# Patient Record
Sex: FEMALE | NOT HISPANIC OR LATINO | Employment: OTHER | URBAN - METROPOLITAN AREA
[De-identification: names, ages, dates, MRNs, and addresses within clinical notes are randomized per-mention and may not be internally consistent; named-entity substitution may affect disease eponyms.]

---

## 2018-10-02 ENCOUNTER — OFFICE VISIT - HEALTHEAST (OUTPATIENT)
Dept: FAMILY MEDICINE | Facility: CLINIC | Age: 23
End: 2018-10-02

## 2018-10-02 ENCOUNTER — COMMUNICATION - HEALTHEAST (OUTPATIENT)
Dept: TELEHEALTH | Facility: CLINIC | Age: 23
End: 2018-10-02

## 2018-10-02 ENCOUNTER — HOSPITAL ENCOUNTER (OUTPATIENT)
Dept: LAB | Age: 23
Setting detail: SPECIMEN
Discharge: HOME | End: 2018-10-02

## 2018-10-02 DIAGNOSIS — N83.209 OVARIAN CYST: ICD-10-CM

## 2018-10-02 DIAGNOSIS — Z76.89 ESTABLISHING CARE WITH NEW DOCTOR, ENCOUNTER FOR: ICD-10-CM

## 2018-10-02 DIAGNOSIS — Z11.3 VENEREAL DISEASE SCREENING: ICD-10-CM

## 2018-10-02 DIAGNOSIS — Z00.00 ANNUAL PHYSICAL EXAM: ICD-10-CM

## 2018-10-02 LAB — HIV 1+2 AB+HIV1 P24 AG SERPL QL IA: NEGATIVE

## 2018-10-02 ASSESSMENT — MIFFLIN-ST. JEOR: SCORE: 1647.4

## 2018-10-03 ENCOUNTER — AMBULATORY - HEALTHEAST (OUTPATIENT)
Dept: FAMILY MEDICINE | Facility: CLINIC | Age: 23
End: 2018-10-03

## 2018-10-03 LAB
C TRACH DNA SPEC QL PROBE+SIG AMP: NEGATIVE
N GONORRHOEA DNA SPEC QL NAA+PROBE: NEGATIVE
T PALLIDUM AB SER QL: NEGATIVE

## 2018-10-04 ENCOUNTER — COMMUNICATION - HEALTHEAST (OUTPATIENT)
Dept: FAMILY MEDICINE | Facility: CLINIC | Age: 23
End: 2018-10-04

## 2018-10-15 ENCOUNTER — HOSPITAL ENCOUNTER (OUTPATIENT)
Dept: ULTRASOUND IMAGING | Facility: HOSPITAL | Age: 23
Discharge: HOME | End: 2018-10-15
Attending: FAMILY MEDICINE

## 2018-10-15 DIAGNOSIS — N83.209 OVARIAN CYST: ICD-10-CM

## 2018-10-19 ENCOUNTER — OFFICE VISIT - HEALTHEAST (OUTPATIENT)
Dept: FAMILY MEDICINE | Facility: CLINIC | Age: 23
End: 2018-10-19

## 2018-10-19 ENCOUNTER — HOSPITAL ENCOUNTER (OUTPATIENT)
Dept: LAB | Age: 23
Setting detail: SPECIMEN
Discharge: HOME | End: 2018-10-19

## 2018-10-19 DIAGNOSIS — Z34.90 EARLY STAGE OF PREGNANCY: ICD-10-CM

## 2018-10-19 LAB — HCG SERPL-ACNC: 6151 MLU/ML (ref 0–4)

## 2018-10-24 ENCOUNTER — COMMUNICATION - HEALTHEAST (OUTPATIENT)
Dept: FAMILY MEDICINE | Facility: CLINIC | Age: 23
End: 2018-10-24

## 2018-10-29 ENCOUNTER — HOSPITAL ENCOUNTER (OUTPATIENT)
Dept: ULTRASOUND IMAGING | Facility: HOSPITAL | Age: 23
Discharge: HOME | End: 2018-10-29
Attending: FAMILY MEDICINE

## 2018-10-31 ENCOUNTER — COMMUNICATION - HEALTHEAST (OUTPATIENT)
Dept: FAMILY MEDICINE | Facility: CLINIC | Age: 23
End: 2018-10-31

## 2018-11-09 ENCOUNTER — OFFICE VISIT - HEALTHEAST (OUTPATIENT)
Dept: FAMILY MEDICINE | Facility: CLINIC | Age: 23
End: 2018-11-09

## 2018-11-09 DIAGNOSIS — Z34.90 PREGNANCY: ICD-10-CM

## 2018-11-09 ASSESSMENT — MIFFLIN-ST. JEOR: SCORE: 1684.25

## 2019-05-10 ENCOUNTER — OFFICE VISIT - HEALTHEAST (OUTPATIENT)
Dept: FAMILY MEDICINE | Facility: CLINIC | Age: 24
End: 2019-05-10

## 2019-05-10 DIAGNOSIS — Z30.9 ENCOUNTER FOR CONTRACEPTIVE MANAGEMENT, UNSPECIFIED TYPE: ICD-10-CM

## 2019-05-10 DIAGNOSIS — M54.50 ACUTE BILATERAL LOW BACK PAIN WITHOUT SCIATICA: ICD-10-CM

## 2019-05-10 ASSESSMENT — MIFFLIN-ST. JEOR: SCORE: 1702.97

## 2020-07-21 ENCOUNTER — OFFICE VISIT - HEALTHEAST (OUTPATIENT)
Dept: FAMILY MEDICINE | Facility: CLINIC | Age: 25
End: 2020-07-21

## 2020-07-21 DIAGNOSIS — Z00.00 HEALTHCARE MAINTENANCE: ICD-10-CM

## 2020-07-21 DIAGNOSIS — R03.0 ELEVATED BLOOD PRESSURE READING WITHOUT DIAGNOSIS OF HYPERTENSION: ICD-10-CM

## 2020-07-21 DIAGNOSIS — Z32.00 POSSIBLE PREGNANCY: ICD-10-CM

## 2020-07-21 LAB
ALBUMIN UR-MCNC: NEGATIVE MG/DL
ANION GAP SERPL CALCULATED.3IONS-SCNC: 11 MMOL/L (ref 5–18)
APPEARANCE UR: CLEAR
BACTERIA #/AREA URNS HPF: ABNORMAL HPF
BILIRUB UR QL STRIP: NEGATIVE
BUN SERPL-MCNC: 7 MG/DL (ref 8–22)
CALCIUM SERPL-MCNC: 9.3 MG/DL (ref 8.5–10.5)
CHLORIDE BLD-SCNC: 104 MMOL/L (ref 98–107)
CHOLEST SERPL-MCNC: 145 MG/DL
CO2 SERPL-SCNC: 25 MMOL/L (ref 22–31)
COLOR UR AUTO: YELLOW
CREAT SERPL-MCNC: 0.75 MG/DL (ref 0.6–1.1)
FASTING STATUS PATIENT QL REPORTED: NO
GFR SERPL CREATININE-BSD FRML MDRD: >60 ML/MIN/1.73M2
GLUCOSE BLD-MCNC: 88 MG/DL (ref 70–125)
GLUCOSE UR STRIP-MCNC: NEGATIVE MG/DL
HCG UR QL: NEGATIVE
HDLC SERPL-MCNC: 34 MG/DL
HGB BLD-MCNC: 13.8 G/DL (ref 12–16)
HGB UR QL STRIP: ABNORMAL
KETONES UR STRIP-MCNC: NEGATIVE MG/DL
LEUKOCYTE ESTERASE UR QL STRIP: ABNORMAL
NITRATE UR QL: NEGATIVE
PH UR STRIP: 6 [PH] (ref 5–8)
POTASSIUM BLD-SCNC: 4 MMOL/L (ref 3.5–5)
RBC #/AREA URNS AUTO: ABNORMAL HPF
SODIUM SERPL-SCNC: 140 MMOL/L (ref 136–145)
SP GR UR STRIP: 1.02 (ref 1–1.03)
SQUAMOUS #/AREA URNS AUTO: ABNORMAL LPF
UROBILINOGEN UR STRIP-ACNC: ABNORMAL
WBC #/AREA URNS AUTO: ABNORMAL HPF

## 2020-07-21 ASSESSMENT — MIFFLIN-ST. JEOR: SCORE: 1687.09

## 2020-07-22 LAB — BACTERIA SPEC CULT: NORMAL

## 2020-07-24 ENCOUNTER — COMMUNICATION - HEALTHEAST (OUTPATIENT)
Dept: FAMILY MEDICINE | Facility: CLINIC | Age: 25
End: 2020-07-24

## 2020-12-21 ENCOUNTER — OFFICE VISIT - HEALTHEAST (OUTPATIENT)
Dept: FAMILY MEDICINE | Facility: CLINIC | Age: 25
End: 2020-12-21

## 2020-12-21 DIAGNOSIS — Z11.59 ENCOUNTER FOR HEPATITIS C SCREENING TEST FOR LOW RISK PATIENT: ICD-10-CM

## 2020-12-21 DIAGNOSIS — N76.0 BACTERIAL VAGINOSIS: ICD-10-CM

## 2020-12-21 DIAGNOSIS — M54.50 CHRONIC BILATERAL LOW BACK PAIN WITHOUT SCIATICA: ICD-10-CM

## 2020-12-21 DIAGNOSIS — N63.0 BREAST MASS: ICD-10-CM

## 2020-12-21 DIAGNOSIS — Z00.00 ANNUAL PHYSICAL EXAM: ICD-10-CM

## 2020-12-21 DIAGNOSIS — Z72.51 UNPROTECTED SEX: ICD-10-CM

## 2020-12-21 DIAGNOSIS — G89.29 CHRONIC BILATERAL LOW BACK PAIN WITHOUT SCIATICA: ICD-10-CM

## 2020-12-21 DIAGNOSIS — R03.0 ELEVATED BLOOD PRESSURE READING WITHOUT DIAGNOSIS OF HYPERTENSION: ICD-10-CM

## 2020-12-21 DIAGNOSIS — B96.89 BACTERIAL VAGINOSIS: ICD-10-CM

## 2020-12-21 LAB
CLUE CELLS: ABNORMAL
HCG UR QL: NEGATIVE
HIV 1+2 AB+HIV1 P24 AG SERPL QL IA: NEGATIVE
TRICHOMONAS, WET PREP: ABNORMAL
YEAST, WET PREP: ABNORMAL

## 2020-12-21 ASSESSMENT — MIFFLIN-ST. JEOR: SCORE: 1698.43

## 2020-12-22 LAB
HCV AB SERPL QL IA: NEGATIVE
T PALLIDUM AB SER QL: NEGATIVE

## 2020-12-23 ENCOUNTER — COMMUNICATION - HEALTHEAST (OUTPATIENT)
Dept: FAMILY MEDICINE | Facility: CLINIC | Age: 25
End: 2020-12-23

## 2020-12-23 LAB
BKR LAB AP ABNORMAL BLEEDING: NO
BKR LAB AP BIRTH CONTROL/HORMONES: NORMAL
BKR LAB AP CERVICAL APPEARANCE: NORMAL
BKR LAB AP GYN ADEQUACY: NORMAL
BKR LAB AP GYN INTERPRETATION: NORMAL
BKR LAB AP GYN OTHER FINDINGS: NORMAL
BKR LAB AP HPV REFLEX: NORMAL
BKR LAB AP LMP: NORMAL
BKR LAB AP PATIENT STATUS: NORMAL
BKR LAB AP PREVIOUS ABNORMAL: NORMAL
BKR LAB AP PREVIOUS NORMAL: NORMAL
HIGH RISK?: NO
PATH REPORT.COMMENTS IMP SPEC: NORMAL
RESULT FLAG (HE HISTORICAL CONVERSION): NORMAL

## 2020-12-24 LAB
C TRACH DNA SPEC QL PROBE+SIG AMP: NEGATIVE
N GONORRHOEA DNA SPEC QL NAA+PROBE: NEGATIVE

## 2020-12-29 ENCOUNTER — COMMUNICATION - HEALTHEAST (OUTPATIENT)
Dept: FAMILY MEDICINE | Facility: CLINIC | Age: 25
End: 2020-12-29

## 2021-01-20 ENCOUNTER — TRANSFERRED RECORDS (OUTPATIENT)
Dept: HEALTH INFORMATION MANAGEMENT | Facility: CLINIC | Age: 26
End: 2021-01-20

## 2021-01-21 ENCOUNTER — COMMUNICATION - HEALTHEAST (OUTPATIENT)
Dept: FAMILY MEDICINE | Facility: CLINIC | Age: 26
End: 2021-01-21

## 2021-01-25 ENCOUNTER — OFFICE VISIT - HEALTHEAST (OUTPATIENT)
Dept: FAMILY MEDICINE | Facility: CLINIC | Age: 26
End: 2021-01-25

## 2021-01-25 DIAGNOSIS — S69.91XS FINGER INJURY, RIGHT, SEQUELA: ICD-10-CM

## 2021-01-25 DIAGNOSIS — Z01.818 PREOP EXAMINATION: ICD-10-CM

## 2021-01-25 DIAGNOSIS — N63.10 LUMP OF RIGHT BREAST: ICD-10-CM

## 2021-01-25 LAB
HCG UR QL: NEGATIVE
HGB BLD-MCNC: 14.2 G/DL (ref 12–16)

## 2021-01-25 ASSESSMENT — MIFFLIN-ST. JEOR: SCORE: 1632.66

## 2021-01-27 ENCOUNTER — TRANSFERRED RECORDS (OUTPATIENT)
Dept: HEALTH INFORMATION MANAGEMENT | Facility: CLINIC | Age: 26
End: 2021-01-27

## 2021-02-02 ENCOUNTER — HOSPITAL ENCOUNTER (OUTPATIENT)
Dept: MAMMOGRAPHY | Facility: CLINIC | Age: 26
Discharge: HOME | End: 2021-02-02
Attending: FAMILY MEDICINE

## 2021-02-02 DIAGNOSIS — N63.10 LUMP OF RIGHT BREAST: ICD-10-CM

## 2021-02-03 ENCOUNTER — COMMUNICATION - HEALTHEAST (OUTPATIENT)
Dept: FAMILY MEDICINE | Facility: CLINIC | Age: 26
End: 2021-02-03

## 2021-02-15 ENCOUNTER — TRANSFERRED RECORDS (OUTPATIENT)
Dept: HEALTH INFORMATION MANAGEMENT | Facility: CLINIC | Age: 26
End: 2021-02-15

## 2021-03-03 ENCOUNTER — TRANSFERRED RECORDS (OUTPATIENT)
Dept: HEALTH INFORMATION MANAGEMENT | Facility: CLINIC | Age: 26
End: 2021-03-03

## 2021-03-10 ENCOUNTER — TRANSFERRED RECORDS (OUTPATIENT)
Dept: HEALTH INFORMATION MANAGEMENT | Facility: CLINIC | Age: 26
End: 2021-03-10

## 2021-04-12 ENCOUNTER — OFFICE VISIT - HEALTHEAST (OUTPATIENT)
Dept: FAMILY MEDICINE | Facility: CLINIC | Age: 26
End: 2021-04-12

## 2021-04-12 DIAGNOSIS — E66.09 CLASS 2 OBESITY DUE TO EXCESS CALORIES WITHOUT SERIOUS COMORBIDITY WITH BODY MASS INDEX (BMI) OF 35.0 TO 35.9 IN ADULT: ICD-10-CM

## 2021-04-12 DIAGNOSIS — M25.50 ARTHRALGIA, UNSPECIFIED JOINT: ICD-10-CM

## 2021-04-12 DIAGNOSIS — E66.812 CLASS 2 OBESITY DUE TO EXCESS CALORIES WITHOUT SERIOUS COMORBIDITY WITH BODY MASS INDEX (BMI) OF 35.0 TO 35.9 IN ADULT: ICD-10-CM

## 2021-04-12 DIAGNOSIS — R53.82 CHRONIC FATIGUE: ICD-10-CM

## 2021-04-12 DIAGNOSIS — Z82.69 FAMILY HISTORY OF SYSTEMIC LUPUS ERYTHEMATOSUS (SLE) IN MOTHER: ICD-10-CM

## 2021-04-12 DIAGNOSIS — R21 FACIAL RASH: ICD-10-CM

## 2021-04-13 ENCOUNTER — COMMUNICATION - HEALTHEAST (OUTPATIENT)
Dept: FAMILY MEDICINE | Facility: CLINIC | Age: 26
End: 2021-04-13

## 2021-04-19 ENCOUNTER — COMMUNICATION - HEALTHEAST (OUTPATIENT)
Dept: FAMILY MEDICINE | Facility: CLINIC | Age: 26
End: 2021-04-19

## 2021-04-21 ENCOUNTER — COMMUNICATION - HEALTHEAST (OUTPATIENT)
Dept: FAMILY MEDICINE | Facility: CLINIC | Age: 26
End: 2021-04-21

## 2021-04-23 ENCOUNTER — OFFICE VISIT - HEALTHEAST (OUTPATIENT)
Dept: FAMILY MEDICINE | Facility: CLINIC | Age: 26
End: 2021-04-23

## 2021-04-23 DIAGNOSIS — Z13.29 SCREENING FOR THYROID DISORDER: ICD-10-CM

## 2021-04-23 DIAGNOSIS — Z13.0 SCREENING FOR ENDOCRINE, NUTRITIONAL, METABOLIC AND IMMUNITY DISORDER: ICD-10-CM

## 2021-04-23 DIAGNOSIS — Z13.29 SCREENING FOR ENDOCRINE, NUTRITIONAL, METABOLIC AND IMMUNITY DISORDER: ICD-10-CM

## 2021-04-23 DIAGNOSIS — E78.5 DYSLIPIDEMIA: ICD-10-CM

## 2021-04-23 DIAGNOSIS — Z13.220 SCREENING FOR LIPID DISORDERS: ICD-10-CM

## 2021-04-23 DIAGNOSIS — E66.09 CLASS 1 OBESITY DUE TO EXCESS CALORIES WITH SERIOUS COMORBIDITY AND BODY MASS INDEX (BMI) OF 31.0 TO 31.9 IN ADULT: ICD-10-CM

## 2021-04-23 DIAGNOSIS — Z13.228 SCREENING FOR ENDOCRINE, NUTRITIONAL, METABOLIC AND IMMUNITY DISORDER: ICD-10-CM

## 2021-04-23 DIAGNOSIS — E66.811 CLASS 1 OBESITY DUE TO EXCESS CALORIES WITH SERIOUS COMORBIDITY AND BODY MASS INDEX (BMI) OF 31.0 TO 31.9 IN ADULT: ICD-10-CM

## 2021-04-23 DIAGNOSIS — Z13.0 SCREENING FOR DEFICIENCY ANEMIA: ICD-10-CM

## 2021-04-23 DIAGNOSIS — Z13.1 SCREENING FOR DIABETES MELLITUS: ICD-10-CM

## 2021-04-23 DIAGNOSIS — Z13.21 SCREENING FOR ENDOCRINE, NUTRITIONAL, METABOLIC AND IMMUNITY DISORDER: ICD-10-CM

## 2021-04-23 DIAGNOSIS — R06.83 SNORES: ICD-10-CM

## 2021-04-23 ASSESSMENT — MIFFLIN-ST. JEOR: SCORE: 1586.17

## 2021-04-23 NOTE — ASSESSMENT & PLAN NOTE
25 y.o. female in clinic today to discuss treatment of the following conditions through radical diet change, lifestyle modification and weight loss:  1. Class 1 obesity due to excess calories with serious comorbidity and body mass index (BMI) of 31.0 to 31.9 in adult    2. Snores    3. Dyslipidemia    4. Screening for diabetes mellitus    5. Screening for thyroid disorder    6. Screening for deficiency anemia    7. Screening for endocrine, nutritional, metabolic and immunity disorder    8. Screening for lipid disorders      This patient has been doing a great job of losing weight through reduction of consumption of carbohydrates/sugar through drinks.  She is also reduced the amount of fast food that she consumes.  Her weight has stagnated in the 180s.  She is a history of low HDL cholesterol but no obvious history of prediabetes/metabolic syndrome.  We had a lengthy conversation regarding nutrition and different dietary strategies.  She describes fatigue and apneic episodes.  Nutrition overall is okay.  -Fasting lab work will be completed next week when she has a lab draw.  -She is congratulated on her weight loss.  We discussed expectations.  -Sleep medicine referral placed given snoring and apneic episodes.  -Trial of phentermine/topiramate.  We discussed that this is a pregnancy category asked medication.  She is currently not sexually active.  -Follow-up in 1 month.  Meet with dietitian next week.

## 2021-05-04 ENCOUNTER — COMMUNICATION - HEALTHEAST (OUTPATIENT)
Dept: SURGERY | Facility: CLINIC | Age: 26
End: 2021-05-04

## 2021-05-19 ENCOUNTER — OFFICE VISIT - HEALTHEAST (OUTPATIENT)
Dept: FAMILY MEDICINE | Facility: CLINIC | Age: 26
End: 2021-05-19

## 2021-05-19 DIAGNOSIS — E78.5 DYSLIPIDEMIA: ICD-10-CM

## 2021-05-19 DIAGNOSIS — E66.811 CLASS 1 OBESITY DUE TO EXCESS CALORIES WITH SERIOUS COMORBIDITY AND BODY MASS INDEX (BMI) OF 30.0 TO 30.9 IN ADULT: ICD-10-CM

## 2021-05-19 DIAGNOSIS — Z71.3 NUTRITIONAL COUNSELING: ICD-10-CM

## 2021-05-19 DIAGNOSIS — E66.09 CLASS 1 OBESITY DUE TO EXCESS CALORIES WITH SERIOUS COMORBIDITY AND BODY MASS INDEX (BMI) OF 30.0 TO 30.9 IN ADULT: ICD-10-CM

## 2021-05-24 ENCOUNTER — OFFICE VISIT - HEALTHEAST (OUTPATIENT)
Dept: FAMILY MEDICINE | Facility: CLINIC | Age: 26
End: 2021-05-24

## 2021-05-24 DIAGNOSIS — R06.83 SNORES: ICD-10-CM

## 2021-05-24 DIAGNOSIS — E66.3 OVERWEIGHT (BMI 25.0-29.9): ICD-10-CM

## 2021-05-24 DIAGNOSIS — E78.5 DYSLIPIDEMIA: ICD-10-CM

## 2021-05-24 RX ORDER — PHENTERMINE HYDROCHLORIDE 15 MG/1
15 CAPSULE ORAL EVERY MORNING
Qty: 90 CAPSULE | Refills: 0 | Status: SHIPPED | OUTPATIENT
Start: 2021-05-24 | End: 2023-04-17

## 2021-05-24 RX ORDER — TOPIRAMATE 25 MG/1
25 TABLET, FILM COATED ORAL 2 TIMES DAILY
Qty: 180 TABLET | Refills: 1 | Status: SHIPPED | OUTPATIENT
Start: 2021-05-24 | End: 2023-04-17

## 2021-05-24 NOTE — ASSESSMENT & PLAN NOTE
25 y.o. year old female in clinic today to discuss treatment of the following conditions through diet and lifestyle modification and weight loss:  1. Overweight (BMI 25.0-29.9)    2. Dyslipidemia    3. Snores      The patient's weight loss result since the last visit was successful based on weight loss.  Some persistent cravings. Emotional lability with phentermine?  Met with dietician.  Discussed goal of developing meal plan to reduce / eliminate snacking.   - continue phentermine/topiramate.     - continue to evolve meal plan.   - exercise as able.     Follow-up: 2 months

## 2021-05-28 NOTE — PROGRESS NOTES
"Assessment/ Plan  1. Acute bilateral low back pain without sciatica  4 months duration  No neuropathic symptoms.  No red flags.    Analgesics prescribed : Using Tylenol which seems to work better than ibuprofen  Physical/ mechanical interventions: referral to PT  Recommend remaining physically active  Patient is not working  Follow-up: 2 months if not improving  Patient appears deconditioned, poor muscle mass, has started to work out 4 days a week in gym  - Ambulatory referral to PT/OT    2. Encounter for contraceptive management, unspecified type  Using NuvaRing      Subjective    Chief Complaint   Patient presents with     Back Pain       HPI  Back pain  ---------------------  Duration/timing- 4 + months  Location/ radiation- middle, sacral region  Quality/Severity-sharp/ moderate  Context/modifying factors-moving  Red flags- progressive weakness, weight loss/ fever, night-time awakening?-No  Back pain history  previous evaluation, treatment, imaging? - seeing chiro- has helped some  Comments- upper back  Not working  Trying to be more acrie      Current Outpatient Medications on File Prior to Visit   Medication Sig     acetaminophen (TYLENOL) 325 MG tablet Take 650 mg by mouth every 6 (six) hours as needed for pain.     No current facility-administered medications on file prior to visit.      Patient Active Problem List   Diagnosis     Ovarian cyst     No past surgical history on file.  No family history on file.    ROS  As listed above    Objective  Physical Exam  Vitals:    05/10/19 1133   BP: 120/70   Patient Site: Right Arm   Patient Position: Sitting   Cuff Size: Adult Large   Pulse: 80   Resp: 16   Weight: 211 lb (95.7 kg)   Height: 5' 5\" (1.651 m)     Back examined, no obvious scoliosis.  Mild paraspinous muscle tenerness lower lumbar  No spinous process tenderness.     Moderate pain on palapation of bilateral sacroiliac region.   No  pain on palpation of bilateral ischial tuberosity  .   Negative straight " leg raise bilateral.    Patient able to walk on toes and heels and step up on step (knee extension) without difficulty.    Reflexes intact and symmetric.        Please note: Voice recognition software was used in this dictation.  It may therefore contain typographical errors.

## 2021-06-02 VITALS — HEIGHT: 64 IN | BODY MASS INDEX: 36.22 KG/M2 | WEIGHT: 212.13 LBS

## 2021-06-02 VITALS — WEIGHT: 204 LBS | BODY MASS INDEX: 34.83 KG/M2 | HEIGHT: 64 IN

## 2021-06-02 VITALS — BODY MASS INDEX: 36.27 KG/M2 | WEIGHT: 208 LBS

## 2021-06-03 VITALS — HEIGHT: 65 IN | BODY MASS INDEX: 35.16 KG/M2 | WEIGHT: 211 LBS

## 2021-06-04 VITALS
RESPIRATION RATE: 21 BRPM | TEMPERATURE: 97.8 F | HEART RATE: 89 BPM | DIASTOLIC BLOOD PRESSURE: 83 MMHG | WEIGHT: 211 LBS | HEIGHT: 64 IN | SYSTOLIC BLOOD PRESSURE: 129 MMHG | BODY MASS INDEX: 36.02 KG/M2

## 2021-06-05 VITALS
DIASTOLIC BLOOD PRESSURE: 85 MMHG | HEART RATE: 87 BPM | TEMPERATURE: 98.3 F | HEIGHT: 65 IN | SYSTOLIC BLOOD PRESSURE: 124 MMHG | RESPIRATION RATE: 16 BRPM | WEIGHT: 210 LBS | BODY MASS INDEX: 34.99 KG/M2

## 2021-06-05 VITALS
WEIGHT: 187 LBS | RESPIRATION RATE: 18 BRPM | HEIGHT: 65 IN | DIASTOLIC BLOOD PRESSURE: 60 MMHG | OXYGEN SATURATION: 98 % | BODY MASS INDEX: 31.16 KG/M2 | SYSTOLIC BLOOD PRESSURE: 114 MMHG | HEART RATE: 80 BPM

## 2021-06-05 VITALS
SYSTOLIC BLOOD PRESSURE: 127 MMHG | BODY MASS INDEX: 33.97 KG/M2 | HEART RATE: 76 BPM | DIASTOLIC BLOOD PRESSURE: 84 MMHG | HEIGHT: 64 IN | WEIGHT: 199 LBS | RESPIRATION RATE: 20 BRPM | TEMPERATURE: 98.2 F | OXYGEN SATURATION: 98 %

## 2021-06-09 NOTE — PROGRESS NOTES
Assessment/ Plan  1. Elevated blood pressure reading without diagnosis of hypertension  Discussed and elected to not start medication, begin monitoring with home blood pressure machine, 3 times a week, copy of dietary information, encouragement and exercise given.  Follow-up for blood pressure check 1 month  - Basic Metabolic Panel  - Urinalysis-UC if Indicated  - Hemoglobin  - blood pressure monitor Kit; Home blood pressure monitor- a nice one with electronic memory Dx- Hypertension  Dispense: 1 each; Refill: 0  - Cholesterol, Total  - HDL Cholesterol  - Culture, Urine    2. Possible pregnancy  Negative, counseled regarding contraception, patient declined at this time  - Pregnancy, Urine    3. Healthcare maintenance  As above, discussed contraception    Body mass index is 36.22 kg/m .    Subjective  CC:  Chief Complaint   Patient presents with     Blood Pressure Check     Possible Pregnancy     HPI:  Hypertension evaluation/  Patient was told she had very elevated blood pressure to dental visits recently, also in the emergency room on 2/21  BP Readings from Last 3 Encounters:   07/21/20 129/83   02/21/20 (!) 154/98   12/24/19 145/90       Previous history of known elevated blood pressure?  Yes: dentist, er  FH of elevated blood pressure?  Yes: grandpa, mon  Personal history of vascular dz or stroke?  No    Symptoms:  Edema: No  SOB/Chest pains: No    Secondary cause eval:  Alcohol use?  No  Regular NSAID use: No  Illicit Drug use: No  Other medication: No  Snoring?  Yes: mild  Diet assesment:no    Patient also concerned about possibility of pregnancy, so monthly for.  Not using hormonal contraception at this time.  Discussed patient understands risk of pregnancy.  Frustrated since she could not remember to take birth control pills, trouble with IUD, gained weight with progestin-based contraception.  Does not want to consider other options.age.  Discussed that IUD could certainly be tried again.  Patient Active  "Problem List   Diagnosis     Ovarian cyst     Current medications reviewed as follows:  Current Outpatient Medications on File Prior to Visit   Medication Sig     acetaminophen (TYLENOL) 325 MG tablet Take 650 mg by mouth every 6 (six) hours as needed for pain.     oxymetazoline (AFRIN) 0.05 % nasal spray Do not use for more than 3 days in a row.     pseudoephedrine (SUDAFED) 120 mg 12 hr tablet Take 1 tablet (120 mg total) by mouth every 12 (twelve) hours.     No current facility-administered medications on file prior to visit.      Social History     Tobacco Use   Smoking Status Former Smoker   Smokeless Tobacco Never Used   Tobacco Comment    THC hx     Social History     Social History Narrative     Not on file     Patient Care Team:  Anabel Sharma DO as PCP - General (Family Medicine)  Anabel Sharma DO as Assigned PCP  ROS  Full 10 system review including constitutional, respiratory, cardiac, gi, urinary, rheumatologic, neurologic, reproductive, dermatologic psychiatric is  performed  and is otherwise negative         Objective  Physical Exam  Vitals:    07/21/20 1453   BP: 129/83   Patient Site: Right Arm   Patient Position: Sitting   Cuff Size: Adult Large   Pulse: 89   Resp: 21   Temp: 97.8  F (36.6  C)   TempSrc: Temporal   Weight: 211 lb (95.7 kg)   Height: 5' 4\" (1.626 m)     Gen- alert, oriented/ appropriately responsive  HEENT- normal cephalic, atraumatic.   Chest- Normal inspiration and expiration.    Clear to ascultation.    No chest wall deformity or scar.  CV- Heart regular rate and rhythm  normal tones, no murmurs   No gallops or rubs.  Ext- appear well perfused, no edema  Skin- warm and dry,   no visualized rash    Diagnostics  Pending    Please note: Voice recognition software was used in this dictation.  It may therefore contain typographical errors.    "

## 2021-06-13 NOTE — PROGRESS NOTES
ANNUAL       Chief Complaint   Patient presents with     Annual Exam     STD CHECK     Supply request     want a prescription for a back brace        HISTORY OF PRESENT ILLNESS:  Pt is a 25 y.o.   alert female who presents today for an annual exam.    Concerns today: back pain. got new chiropractor. Scoliosis in lower back?. Back brace for at work?  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 In a stable relationship and okay with getting pregnant if it happens    Std screening: yes    Healthy Habits:   Regular Exercise: not really  Healthy Diet: not really  Dental Visits Regularly: yes  Seat Belt: yes  Sexually active: yes  Self Breast Exam Monthly:no    Patient's last menstrual period was 2020 (approximate).    Allergies   Allergen Reactions     Nickel        Current Outpatient Medications on File Prior to Visit   Medication Sig Dispense Refill     acetaminophen (TYLENOL) 325 MG tablet Take 650 mg by mouth every 6 (six) hours as needed for pain.       oxymetazoline (AFRIN) 0.05 % nasal spray Do not use for more than 3 days in a row.  0     pseudoephedrine (SUDAFED) 120 mg 12 hr tablet Take 1 tablet (120 mg total) by mouth every 12 (twelve) hours. 14 tablet 0     No current facility-administered medications on file prior to visit.        Past Medical History:   Diagnosis Date      (normal spontaneous vaginal delivery) 2016     Supervision of high risk pregnancy in third trimester 2016    Overview:  MPP  TESTING ONLY   NEXT VISIT ALERTS:  More testing?-order unclear    PRIMARY DIAGNOSIS: 20 y.o.        Estimated Date of Delivery: 16  Elevated BP Daily THC use-to control nausea, sleep and coping with anxiety Depression-on Zoloft H/O smoking  TESTING PLANS:   BPP/NST  LAST GROWTH:   PRIMARY PHYSICIAN/PHONE:  Dr White [id number]  PERTINENT LABS: B+ 11/2/15, 3/2/16, 16  UDS-+THC  PERTINENT MEDS: Zoloft  PLAN:       No past surgical history on  file.    Social History     Socioeconomic History     Marital status: Single     Spouse name: Not on file     Number of children: Not on file     Years of education: Not on file     Highest education level: Not on file   Occupational History     Not on file   Social Needs     Financial resource strain: Not on file     Food insecurity     Worry: Not on file     Inability: Not on file     Transportation needs     Medical: Not on file     Non-medical: Not on file   Tobacco Use     Smoking status: Former Smoker     Smokeless tobacco: Never Used     Tobacco comment: THC hx   Substance and Sexual Activity     Alcohol use: Not on file     Drug use: Yes     Types: Marijuana     Sexual activity: Yes     Partners: Male     Birth control/protection: None   Lifestyle     Physical activity     Days per week: Not on file     Minutes per session: Not on file     Stress: Not on file   Relationships     Social connections     Talks on phone: Not on file     Gets together: Not on file     Attends Hinduism service: Not on file     Active member of club or organization: Not on file     Attends meetings of clubs or organizations: Not on file     Relationship status: Not on file     Intimate partner violence     Fear of current or ex partner: Not on file     Emotionally abused: Not on file     Physically abused: Not on file     Forced sexual activity: Not on file   Other Topics Concern     Not on file   Social History Narrative     Not on file       No family history on file.    OB History        2    Para   1    Term   1       0    AB   0    Living   1       SAB        TAB        Ectopic        Multiple        Live Births                     GYNECOLOGIC HISTORY:   Menses are regular and normal in consistency.   Rebecca Salas has Pos history of STDs.  Rebecca Salas has no history of abnormal Pap smears.   Last pap result was normal.    REVIEW OF SYSTEMS:    Constitutional:  Denies Headache.  No weight changes.   "No fevers or chills.    HEENT:  Denies vision changes or hearing changes. No sinus problems.  Breasts:  Denies breast masses, pain or nipple discharge.    Respiratory:  No breathing issues, cough or shortness of breath  Cardiovascular:  Denies chest pain, syncope or palpitations.    GI:  Denies nausea, vomiting, diarrhea, or constipation  Endocrine:  Denies hot flashes, night sweats, heat or cold intolerance.  Hematologic:  Denies easy bruising or bleeding disorders.  Allergies/Immunologic:  Denies seasonal allergies or any history of immunologic disorders  Neurologic:  Normal sensation and motor control.  No history of seizures or syncope.  Musculoskeletal: + chronic back pain  Skin:  Denies rashes, significant lesions or pruritis.  Psychiatric:  Denies anxiety, depression, memory deficits, and appetite or sleep changes.    PHYSICAL EXAM  /85 (Patient Site: Left Arm, Patient Position: Sitting, Cuff Size: Adult Large)   Pulse 87   Temp 98.3  F (36.8  C) (Temporal)   Resp 16   Ht 5' 5\" (1.651 m)   Wt 210 lb (95.3 kg)   LMP 11/26/2020 (Approximate)   BMI 34.95 kg/m    GENERAL APPEARANCE:  The patient is a pleasant, normal appearing female with normal affect and in no distress.  HEENT: Normocephalic atraumatic.  PERRL, ocular muscles intact.  No conjunctivitis or icterus noticed.  TMs clear with good cone of light reflex.  Oropharynx clear and moist mucous membranes.  NECK: supple.  No cervical lymphadenopathy.  No thyromegaly, no nodules palpated, trachea midline.  LUNGS: Clear bilaterally with normal respiratory effort  HEART:   Regular rate and rhythm.  No murmurs noted.  Pulses are full and symmetrical.  BREASTS: Breast exam performed supine. 1x2cm breast lump right breast 5cm from the areola at the 2:00 position.  In addition to this her breast tissue is lumpy bumpy in general worse in the inferior portions of breast bilaterally but symmetrical here.  The mass was not symmetrical above.  ABDOMEN: " Soft, non-tender, non-distended.  No hepatosplenomegaly.  Normal bowel sounds.  No umbilical or inguinal hernias.  SKIN:  Warm and dry to touch.  No lesions or rashes noted.    PSYCHIATRIC/NEUROLOGIC:  Appropriate mood and affect, normal recall, alert and oriented x 3  EXTREMITIES:  Warm and well perfused.  No edema noted.  Muscle strength and sensation are normal bilaterally 5/5 in both upper and lower extremities.   :  Vulva: Inspection of her external genitalia reveals normal mons pubis, labia minora and labia majora.  Normal appearing clitoris, urethral meatus and [place]'s glands.    Bladder:  No evidence of urethral or bladder tenderness.    Vagina:  Speculum exam reveals pink and moist vaginal mucosa.  Bartholin gland is normal to palpation.  Cervix:  Cervix is normal in appearance with no lesions.  There is no cervical motion tenderness.  Uterus:  Uterus is normal size, mobile and non-tender.  No adnexal masses are palpated.  Adnexa are non-tender to palpation  Perineum:  Perineum appears normal.  Anus:  Normal with no apparent lesions.       Rebecca was seen today for annual exam, std check and supply request.    Diagnoses and all orders for this visit:    Annual physical exam:   Self breast exam risks/benefits reviewed  Safe sex practices reviewed with patient  Contraception reviewed and declines- okay with getting pregnant  HPV testing dicussed and new Pap smear recommendations reviewed with patient  -     blood pressure monitor Kit; Home blood pressure monitor- a nice one with electronic memory Dx- Hypertension  -     Pregnancy (Beta-hCG, Qual), Urine  -     Gynecologic Cytology (PAP Smear)  -     Chlamydia trachomatis & Neisseria gonorrhoeae, Amplified Detection  -     HIV Antigen/Antibody Screening [facility]  -     Syphilis Screen, [facility]  -     Wet Prep, Vaginal    Breast mass: ?fibrocystic disease in addition to need eval for mass palpated today above  -     Mammo Diagnostic Bilateral; Future  -      US Breast Right Limited 1-3 Quadrants; Future    Unprotected sex  -     Pregnancy (Beta-hCG, Qual), Urine    Bacterial vaginosis  -     metroNIDAZOLE (FLAGYL) 500 MG tablet; Take 1 tablet (500 mg total) by mouth 2 (two) times a day for 7 days.    Elevated blood pressure reading without diagnosis of hypertension: normal today but she continues to monitor closely at home  -     blood pressure monitor Kit; Home blood pressure monitor- a nice one with electronic memory Dx- Hypertension    Encounter for hepatitis C screening test for low risk patient  -     Hepatitis C Antibody (Anti-HCV)    Chronic bilateral low back pain without sciatica  -     Neck/Shoulder/Back DME: Lumbosacral Brace         Anabel Sharma

## 2021-06-14 NOTE — TELEPHONE ENCOUNTER
----- Message from Anabel Sharma DO sent at 12/23/2020 11:18 AM CST -----  Please call patient and inform:  All your STD tests were negative.  You did show signs of bacterial vaginosis again.  I can send a prescription to the pharmacy for you.  This would be Flagyl twice a day for 7 days.  Do not drink alcohol while taking this medication as it can make you feel pretty sick.  We are still waiting for your Pap smear results and we will get back to you on that.
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
## 2021-06-14 NOTE — TELEPHONE ENCOUNTER
New Appointment Needed  What is the reason for the visit:    Pre-Op Appt Request  When is the surgery? :  01/27/21  Where is the surgery?:  [facility].  Patient unsure of which location  Who is the surgeon? :  Patient does not remember.  What type of surgery is being done?: Hand Surger.  Pinky finger right hand  Provider Preference: PCP only  How soon do you need to be seen?: As soon possible  Waitlist offered?: No  Okay to leave a detailed message:  Yes

## 2021-06-14 NOTE — PROGRESS NOTES
Please call patient and inform:  All your STD tests were negative.  You did show signs of bacterial vaginosis again.  I can send a prescription to the pharmacy for you.  This would be Flagyl twice a day for 7 days.  Do not drink alcohol while taking this medication as it can make you feel pretty sick.  We are still waiting for your Pap smear results and we will get back to you on that.

## 2021-06-16 ENCOUNTER — COMMUNICATION - HEALTHEAST (OUTPATIENT)
Dept: SURGERY | Facility: CLINIC | Age: 26
End: 2021-06-16

## 2021-06-16 PROBLEM — E66.3 OVERWEIGHT (BMI 25.0-29.9): Status: ACTIVE | Noted: 2021-04-23

## 2021-06-16 PROBLEM — N83.209 OVARIAN CYST: Status: ACTIVE | Noted: 2018-10-02

## 2021-06-16 PROBLEM — E78.5 DYSLIPIDEMIA: Status: ACTIVE | Noted: 2021-04-23

## 2021-06-16 PROBLEM — R06.83 SNORES: Status: ACTIVE | Noted: 2021-04-23

## 2021-06-16 NOTE — PROGRESS NOTES
Rebecca Salas is a 25 y.o. female who is being evaluated via a billable telephone visit.      What phone number would you like to be contacted at? [phone]  How would you like to obtain your AVS? AVS Preference: Mail a copy.    Assessment & Plan     Rebecca was seen today for weight loss and labs only.    Diagnoses and all orders for this visit:    Family history of systemic lupus erythematosus (SLE) in mother: Patient wants to be tested for lupus.  She reports symptoms of fatigue, arthralgia, facial rash and always being tired.  Her mother has lupus and apparently she herself had  lupus due to mom's lupus after she was born.  Looking back on some of the labs I am going to check today they have all been normal in the past.  I cannot say that I highly suspicious of lupus.  However patient would really like to be checked.  Orders placed.  We will have her come in for a lab only visit.  -     HM1(CBC and Differential); Future  -     Creatinine; Future  -     Urinalysis; Future  -     Antinuclear Antibody (RAKESH) Cascade; Future  -     C-Reactive Protein(CRP); Future  -     Sedimentation Rate; Future  -     DNA DS Screen; Future    Chronic fatigue  -     HM1(CBC and Differential); Future  -     Creatinine; Future  -     Urinalysis; Future  -     Antinuclear Antibody (RAKESH) Cascade; Future  -     C-Reactive Protein(CRP); Future  -     Sedimentation Rate; Future  -     DNA DS Screen; Future    Arthralgia, unspecified joint  -     HM1(CBC and Differential); Future  -     Creatinine; Future  -     Urinalysis; Future  -     Antinuclear Antibody (RAKESH) Cascade; Future  -     C-Reactive Protein(CRP); Future  -     Sedimentation Rate; Future  -     DNA DS Screen; Future    Facial rash  -     HM1(CBC and Differential); Future  -     Creatinine; Future  -     Urinalysis; Future  -     Antinuclear Antibody (RAKESH) Cascade; Future  -     C-Reactive Protein(CRP); Future  -     Sedimentation Rate; Future  -     DNA DS  Screen; Future    Class 2 obesity due to excess calories without serious comorbidity with body mass index (BMI) of 35.0 to 35.9 in adult: Patient interested in a comprehensive approach to weight loss.  I highly encouraged her to try the bariatric care clinic and she was agreeable.  She would consider surgery but I do not know that she qualifies.    -     Ambulatory referral to Bariatric Care: Surgical and Non-Surgical        No follow-ups on file.    Anabel Sharma [facility]    Subjective   Rebecca Salas is 25 y.o. and presents today for the following health issues   HPI   Want my daughter and me tested for Lupus  I wanted to get checked  Was just talking to a friend who has Lupus  Mom has Lupus  pt had  lupus  I get sick easily   And when I do get sick it lingers around  Rash on skin around nose and eye brows- light pink  + fatigue  +Muscle aches  No chest pain or shortness of breath.  No hematuria    Lost 16lb  Not happy with wieght for a long time  anything else I can do ?   199lb   [facility]  Scoliosis  Has considered surgery. wondeirn injections?     Review of Systems  Pertinent positives in HPI otherwise 10 point review of systems was negative.      Objective       Vitals:  No vitals were obtained today due to virtual visit.    Physical Exam  Gen: NAD, appears alert and oriented over the phone.      Phone call duration: 13 minutes

## 2021-06-16 NOTE — TELEPHONE ENCOUNTER
Pt called , she will come to her appointment 30 minutes prior to her time and we can help her fill out the forms    Gabby Zheng LPN

## 2021-06-16 NOTE — TELEPHONE ENCOUNTER
Left message to call back for: Rebecca   Information to relay to patient:  See message below, pt must fill out her forms prior to her appointment tomorrow.    Thank you,  Gabby Zheng LPN

## 2021-06-16 NOTE — TELEPHONE ENCOUNTER
Left message to call back for: Rebecca   Information to relay to patient:  Pt has a weight loss intake on Friday, please have her log into my chart and fill out her forms.    Thank you,  Gabby Zheng LPN

## 2021-06-16 NOTE — TELEPHONE ENCOUNTER
Left message to call back for: Rebecca   Information to relay to patient:  See message below and relay, pt must have forms filled out prior to her appointment today.    Thank you,  Gabby

## 2021-06-16 NOTE — TELEPHONE ENCOUNTER
Left message x 1. Please review message thread below and advise the patient as indicated. Please schedule if necessary or indicated in message thread.     ----- Message from Anabel Sharma DO sent at 4/12/2021 11:24 PM CDT -----  Please call patient to schedule lab only visit.
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
## 2021-06-17 NOTE — PROGRESS NOTES
"Rebecca Salas is a 25 y.o. female who is being evaluated via a billable telephone visit.      The patient has been notified of following:     \"This telephone visit will be conducted via a call between you and your physician/provider. We have found that certain health care needs can be provided without the need for a physical exam.  This service lets us provide the care you need with a short phone conversation.  If a prescription is necessary we can send it directly to your pharmacy.  If lab work is needed we can place an order for that and you can then stop by our lab to have the test done at a later time.    Telephone visits are billed at different rates depending on your insurance coverage. During this emergency period, for some insurers they may be billed the same as an in-person visit.  Please reach out to your insurance provider with any questions.    If during the course of the call the physician/provider feels a telephone visit is not appropriate, you will not be charged for this service.\"    Patient has given verbal consent to a Telephone visit? Yes    What phone number would you like to be contacted at? [phone]    Patient would like to receive their AVS by AVS Preference: David.    Additional provider notes: insert own note template here None     Phone call duration: 25 minutes    Jolie Oates RD          Medical Weight Loss Initial Diet Evaluation  Assessment:  Rebecca is presenting today for a new weight management nutrition consultation. Pt has had an initial appointment with Dr. Davis .  Weight loss medication: Phentermine. Topamax   Anthropometrics:  Pt's Initial Weight: 187 lbs  Weight: 187 lb (84.8 kg)  Weight loss from initial: 0  % Weight loss: 0 %  Body Fat %: 31.2  Waist Circumference: 41 inches  Neck Circumference: 14.5 inches  Current Weight: 177 lbs   BMI: There is no height or weight on file to calculate BMI.   Patient weight not recorded  Estimated RMR (Josie Muñoz " equation):  1543 kcals x 1.3 (light active) = 2006 kcals (for weight maintenance)    Recommended Protein Intake: 60-80 grams of protein/day  Medical History:  Patient Active Problem List   Diagnosis     Ovarian cyst     Class 1 obesity due to excess calories with serious comorbidity and body mass index (BMI) of 31.0 to 31.9 in adult     Dyslipidemia     Snores      Diabetes: No   HbA1c:  No results found for: HGBA1C  Nutrition History:   Food allergies/intolerances/cultural or religous food customs: No   Vitamins/Mineral Supplementation: Prenatal Vitamin, Biotin, Cranberry   Dietary Recall:  Breakfast: skipped   Lunch: yogurt with fruit or oatmeal   Dinner: protein, veggies, occasional carbohydrates   Typical Snacks: throughout the day-nuts, apple with PB, fruit, carrots and hummus or pretzel chips with hummus or popcorn   Overnight eating: No (used to in the past, however nothing recently)   Eating out: 1-2 times/week   Beverages: Water, Hot Tea with honey and lemon, Vitamin Water Zero, Cristina   Exercise: starting to incorporate some-cardio with weights (10 lb) 1-2 times/week for 45 minutes   Nutrition Diagnosis (PES statement):   Overweight/Obesity (NC 3.3) related to overeating and poor lifestyle habits as evidenced by patient report of h/o excessive energy intake, lack of exercise, and BMI of 30 kg/m2.   Nutrition Intervention  1. Food and/or Nutrient Delivery   a. Placed emphasis on importance of developing a healthy meal routine, aiming for 3 meals a day and no snacks.  2. Nutrition Education   a. Discussed with patient how to build a meal: the importance of including a lean/low fat protein at each meal, include a source of vegetables at a minimum of lunch and dinner and limiting carbohydrate intake to 1 serving per meal.  b. Educated on sources of lean protein, portion sizes, the amount of grams found in each source. Recommend patient to aim for 20-30g protein at each meal.  c. Discussed the importance of  adequate hydration, with emphasis on drinking 64oz of water or zero calorie beverages per day.  3. Nutrition Counseling   a. Encouraged importance of developing routine exercise for health benefits and weight loss.    Goals established by patient:   1. Focus on protein first, aiming for 60-80 grams of protein/day.   2. Continue to work on increased exercise, eventual goal of 5 times/week for at least 30 minutes.   Handouts provided:  Lean Protein Sources   Assessment/Plan:    Pt will follow up in 1 week(s) with bariatrician and 1 month(s) with dietitian.     Jolie Oates, RD

## 2021-06-17 NOTE — PROGRESS NOTES
"Assessment and Plan:     Overweight (BMI 25.0-29.9)  25 y.o. year old female in clinic today to discuss treatment of the following conditions through diet and lifestyle modification and weight loss:  1. Overweight (BMI 25.0-29.9)    2. Dyslipidemia    3. Snores      The patient's weight loss result since the last visit was successful based on weight loss.  Some persistent cravings. Emotional lability with phentermine?  Met with dietician.  Discussed goal of developing meal plan to reduce / eliminate snacking.   - continue phentermine/topiramate.     - continue to evolve meal plan.   - exercise as able.     Follow-up: 2 months      Continue supplements.    Subjective  Patient presents for treatment of chronic, comorbid conditions using intensive therapeutic lifestyle interventions including nutrition, physical activity, and behavior management.   - successes: \"I felt like I have been doing good at maintaining my weight loss.\"     - struggles: eating sweets.  \"getting enough protein?   - exercise plan: active.  Less gym participation. She is active 1-2x/week.     - tracking/journaling: ad luca.     - following nutritional plan: yes.  Deviations from plan: some sweets   - hunger: cravings.    - medication: benefits: suppressed appetite.  Less hunger.  She found them to be helpful.  Less snacking.   side effects: some irritability initially.  Symptoms decreased after being on the medication for a bit.      No flowsheet data found.    Patient Active Problem List   Diagnosis     Ovarian cyst     Overweight (BMI 25.0-29.9)     Dyslipidemia     Snores       Current Outpatient Medications on File Prior to Visit   Medication Sig Dispense Refill     [DISCONTINUED] phentermine 15 MG capsule Take 1 capsule (15 mg total) by mouth every morning. 30 capsule 0     [DISCONTINUED] topiramate (TOPAMAX) 25 MG tablet Take 1 tablet (25 mg total) by mouth 2 (two) times a day. (1 tablet/night for first 7 days.  Add AM dose if no side " effects) 60 tablet 1     No current facility-administered medications on file prior to visit.        Objective:  Vitals:    05/24/21 1101   BP: 112/58   Pulse: 72     Initial Weight: 187 lbs  Weight: 175 lb (79.4 kg)  Weight loss from initial: 12  % Weight loss: 6.42 %    Body mass index is 29.57 kg/m .  Patient's last menstrual period was 04/24/2021.  GENERAL: Healthy, alert and no distress  EYES: Eyes grossly normal to inspection. No discharge or erythema, or obvious scleral/conjunctival abnormalities.  RESP: No audible wheeze, cough, or visible cyanosis.  No visible retractions or increased work of breathing.    SKIN: Visible skin clear. No significant rash, abnormal pigmentation or lesions.  NEURO: Cranial nerves grossly intact. Mentation and speech appropriate for age.  PSYCH: Mentation appears normal, affect normal/bright, judgement and insight intact, normal speech and appearance well-groomed      This note has been dictated using voice recognition software. Any grammatical or context distortions are unintentional and inherent to the software.

## 2021-06-18 NOTE — PATIENT INSTRUCTIONS - HE
Patient Instructions by Toñito Arnett MD at 7/21/2020  2:40 PM     Author: Toñito Arnett MD Service: -- Author Type: Physician    Filed: 7/21/2020  3:20 PM Encounter Date: 7/21/2020 Status: Signed    : Toñito Arnett MD (Physician)       Changes in your life that may help your blood pressure:    1. Eat a lot of colored vegetables (really any vegetable that isn't potato, rice or corn) .  Fruit too. Your food plate should be half fruits vegetables, like this:    Here is a picture showing a healthy amount of each type of food.      Examples of grains are rice and bread.    Potatoes and noodles are in the same group.  These contain carbohydrates.  Don't have too much of these.     Junk food like chips have a lot of carbohydrates too.    Here is a picture of the DASH food pyramid.  DASH is a healthy diet for people with high blood pressure, high cholesterol or high blood sugars.  It shows the foods you should eat a lot of on the bottom, foods you should eat small amounts of on top.            2. Reduce Salty foods      3. Exercise  30 minutes each day.  At least 150 minutes per week.      4. Limit Alcohol if you drink.  No more than 1 drink per day for women - (1 oz liquor, 1 regular beer, 1 glass of wine), 2 for men.  Don't drink at all if you have difficulty controlling your drinking.    Best bp around 120/75  Treat if above 140/90

## 2021-06-20 NOTE — LETTER
Letter by Toñito Arnett MD at      Author: Toñito Arnett MD Service: -- Author Type: --    Filed:  Encounter Date: 7/24/2020 Status: (Other)         Rebecca Salas  [address]  [place] [address]             July 24, 2020         Dear Ms. Salas,    Below are the results from your recent visit:    Resulted Orders   Pregnancy, Urine   Result Value Ref Range    Pregnancy Test, Urine Negative Negative    Narrative    This test is for screening purposes. Results should be interpreted along with the clinical picture. Confirmation testing is available if warranted by ordering Test 143, Beta HCG, Quantitative.   Basic Metabolic Panel   Result Value Ref Range    Sodium 140 136 - 145 mmol/L    Potassium 4.0 3.5 - 5.0 mmol/L    Chloride 104 98 - 107 mmol/L    CO2 25 22 - 31 mmol/L    Anion Gap, Calculation 11 5 - 18 mmol/L    Glucose 88 70 - 125 mg/dL    Calcium 9.3 8.5 - 10.5 mg/dL    BUN 7 (L) 8 - 22 mg/dL    Creatinine 0.75 0.60 - 1.10 mg/dL    GFR MDRD Af Amer >60 >60 mL/min/1.73m2    GFR MDRD Non Af Amer >60 >60 mL/min/1.73m2    Narrative    Fasting Glucose reference range is 70-99 mg/dL per  American Diabetes Association (ADA) guidelines.   Urinalysis-UC if Indicated   Result Value Ref Range    Color, UA Yellow Colorless, Yellow, Straw, Light Yellow    Clarity, UA Clear Clear    Glucose, UA Negative Negative    Bilirubin, UA Negative Negative    Ketones, UA Negative Negative    Specific Gravity, UA 1.025 1.005 - 1.030    Blood, UA Trace (!) Negative    pH, UA 6.0 5.0 - 8.0    Protein, UA Negative Negative mg/dL    Urobilinogen, UA 0.2 E.U./dL 0.2 E.U./dL, 1.0 E.U./dL    Nitrite, UA Negative Negative    Leukocytes, UA Small (!) Negative    Bacteria, UA Moderate (!) None Seen hpf    RBC, UA 0-2 None Seen, 0-2 hpf    WBC, UA 0-5 None Seen, 0-5 hpf    Squam Epithel, UA 25-50 (!) None Seen, 0-5 lpf    Narrative    Urine Culture ordered based on [facility] criteria   Hemoglobin    Result Value Ref Range    Hemoglobin 13.8 12.0 - 16.0 g/dL   Cholesterol, Total   Result Value Ref Range    Cholesterol 145 <=199 mg/dL   HDL Cholesterol   Result Value Ref Range    HDL Cholesterol 34 (L) >=50 mg/dL    Patient Fasting > 8hrs? No    Culture, Urine   Result Value Ref Range    Culture Mixture of urogenital organisms        Blood and urine test look okay    Please call with questions or contact us using MyChart.    Sincerely,        Electronically signed by Toñito Arnett MD

## 2021-06-20 NOTE — PROGRESS NOTES
ANNUAL       Chief Complaint   Patient presents with     Annual Exam     STD screening       HISTORY OF PRESENT ILLNESS:  Pt is a 22 y.o.  year old   alert female who presents today for an annual exam.    Concerns today: hx of Cysts on ovaries. One ruptured and she went to the emergency room.  We do not have these records records.  Continues with intermittent pain-not every day or severe like the day she went to the hospital.  Sometimes painful with intercourse.      Contraception: birth control pills? Has gained a lot of weight with previous birth control. Depo gained a lot of weight . Stopped taking brith control the weight hasn't stayed on as it did before.  She will think about it.    Std screening: desires, one of the guys she has sex with has given her a previous - chalmydia. Last summer.  Concerned about how much herpes is going around.    Healthy Habits:   Regular Exercise: Yes  Sunscreen Use: Yes  Healthy Diet: Somewhat  Sexually active: 2 partners, male,   Self Breast Exam Monthly: No    Patient's last menstrual period was 2018 (approximate).    Allergies   Allergen Reactions     Nickel        No current outpatient prescriptions on file prior to visit.     No current facility-administered medications on file prior to visit.        No past medical history on file.    No past surgical history on file.    Social History     Social History     Marital status: Single     Spouse name: N/A     Number of children: N/A     Years of education: N/A     Occupational History     Not on file.     Social History Main Topics     Smoking status: Not on file     Smokeless tobacco: Not on file     Alcohol use Not on file     Drug use: Not on file     Sexual activity: Not on file     Other Topics Concern     Not on file     Social History Narrative     No narrative on file       No family history on file.    OB History     No data available          GYNECOLOGIC HISTORY:   Rebecca Salas has positive history of  "STDs.  Rebecca Salas has no history of abnormal Pap smears.   Last pap result was she believes about 1-1/2 years ago when she was 21 after the birth of her daughter.    REVIEW OF SYSTEMS:    Constitutional:  Denies Headache.  No weight changes.  No fevers or chills.    HEENT:  Denies vision changes or hearing changes. No sinus problems.  Breasts:  Denies breast masses, pain or nipple discharge.    Respiratory:  No breathing issues, cough or shortness of breath  Cardiovascular:  Denies chest pain, syncope or palpitations.    GI:  Denies nausea, vomiting, diarrhea, or constipation  Endocrine:  Denies hot flashes, night sweats, heat or cold intolerance.  Hematologic:  Denies easy bruising or bleeding disorders.  Allergies/Immunologic:  Denies seasonal allergies or any history of immunologic disorders  Neurologic:  Normal sensation and motor control.  No history of seizures or syncope.  Musculoskeletal:  Denies joint pain, swelling, or erythema  Skin:  Denies rashes, significant lesions or pruritis.  Psychiatric:  Denies anxiety, depression, memory deficits, and appetite or sleep changes.    PHYSICAL EXAM  /74 (Patient Site: Left Arm, Patient Position: Sitting, Cuff Size: Adult Large)  Pulse 96  Temp 98.3  F (36.8  C) (Oral)   Resp 18  Ht 5' 3.5\" (1.613 m)  Wt 204 lb (92.5 kg)  LMP 09/07/2018 (Approximate)  BMI 35.57 kg/m2  GENERAL APPEARANCE:  The patient is a pleasant, normal appearing female with normal affect and in no distress.  HEENT: Normocephalic atraumatic.  PERRL, ocular muscles intact.  No conjunctivitis or icterus noticed.  TMs clear with good cone of light reflex.  Oropharynx clear and moist mucous membranes.  NECK: supple.  No cervical lymphadenopathy.  No thyromegaly, no nodules palpated, trachea midline.  LUNGS: Clear bilaterally with normal respiratory effort  HEART:   Regular rate and rhythm.  No murmurs noted.  Pulses are full and symmetrical.  BREASTS: Breast exam performed seated " and supine.  No masses, non-tender, no nipple discharge or lymphadenopathy.  Fibrocystic breasts.  ABDOMEN: Soft, non-tender, non-distended.  No hepatosplenomegaly.  Normal bowel sounds.  No umbilical or inguinal hernias.  SKIN:  Warm and dry to touch.  No lesions or rashes noted.    PSYCHIATRIC/NEUROLOGIC:  Appropriate mood and affect, normal recall, alert and oriented x 3  EXTREMITIES:  Warm and well perfused.  No edema noted.  Muscle strength and sensation are normal bilaterally 5/5 in both upper and lower extremities.   :  Vulva: Inspection of her external genitalia reveals normal mons pubis, labia minora and labia majora.  Normal appearing clitoris, urethral meatus and [place]'s glands.    Bladder:  No evidence of urethral or bladder tenderness.    Vagina:  Speculum exam reveals pink and moist vaginal mucosa.  Bartholin gland is normal to palpation.  Cervix:  Cervix is normal in appearance with no lesions.  There is no cervical motion tenderness.  Uterus:  Uterus is normal size, mobile and non-tender.  No adnexal masses are palpated.  Left adnexa is tender to palpation mildly and is feels a little bit enlarged.  Perineum:  Perineum appears normal.  Anus:  Normal with no apparent lesions.       Rebecca was seen today for annual exam and std screening.    Diagnoses and all orders for this visit:    Establishing care with new doctor, encounter for: Histories, allergies and meds reviewed and updated in the chart.    Annual physical exam  Self breast exam risks/benefits reviewed  Safe sex practices reviewed with patient  Contraception reviewed and patient will consider going back on birth control.  Concerned about the weight gain effects.  HPV testing dicussed and new Pap smear recommendations reviewed with patient  Screening for STDs below.    Ovarian cyst: History of.  Still with intermittent pain.  Do not have the records but will obtain repeat ultrasound to assess further.  Left adnexa was mildly tender to  palpation and felt enlarged.  -     US Pelvis With Transvaginal Non OB; Future    Venereal disease screening  -     Chlamydia trachomatis & Neisseria gonorrhoeae, Amplified Detection  -     HIV Antigen/Antibody Screening [place]  -     Syphilis Screen, [facility]    Anabel Sharma

## 2021-06-21 NOTE — PROGRESS NOTES
[facility] clinic EXAM note      Chief Complaint   Patient presents with     Pregnancy Ultrasound     discuss ultrasound, positive pregnancy test at home LMP: 09/06/18       Assessment & Plan    Problem List Items Addressed This Visit     None      Visit Diagnoses     Early stage of pregnancy    -  Primary: active listening and support provided. Will start with lab below monitor hormone levels.  Will obtain another ultrasound in 2 weeks should be around 7 weeks of gestation hopefully will be a better dating ultrasound.  Follow-up in 2 weeks after the ultrasound.  Follow-up sooner if beta hCG is not rising as expected.    Relevant Orders    Beta-hCG, Quantitative    US OB < 14 Weeks            History    Rebecca Salas is a 22 y.o.  female who presents for the following issues:    Patient following up on ultrasound results from 10/15.  Her best friend is with her today for this discussion.  Her last menstrual period was 9/6/18.  She notes she missed her period and took a pregnancy test 2 days before she had a ultrasound done and it was positive.  The ultrasound was supposed to be for follow-up on ovarian cysts and a tender and slightly enlarged left ovary on exam.  However they did note a likely early gestation at around estimated 5 weeks.  Patient still unsure what to do regarding the pregnancy.  She would like more information.  Before making any decisions.\She has had sexual relations with 2 guys.  First 1 is Lon who is been in her life since her daughter was 3 months old.  She states she had sex with him around the 13th or 15 September.  She is seeing another christelle right now for the last 2-1/2 months who she had sex with around the 18th to 20th.  Based on the last menstrual period 5-week ultrasound we did discuss that conception would have been right about in between those 2 dates.  She is very nervous about having conversation with either of the 2 men.  She is also a little bit nervous about having another  kid but she states that she was not really prepared for the first 1 and she is doing just fine.  She would like to focus on school so that is concerning her as well.  But the thing that concerns her the most is having to explain herself to her current partner.    She would like to see where this pregnancy goes make sure it is viable and see if we have any more information that would help her figure out who the father is.  May be in a couple weeks she will have a better idea of whether she wants to keep the pregnancy or not.        mEDICATIONS    No current outpatient prescriptions on file prior to visit.     No current facility-administered medications on file prior to visit.        Pertinent past medical, surgical, social and family history reviewed and updated in [employer].    Social History     Social History     Marital status: Single     Spouse name: N/A     Number of children: N/A     Years of education: N/A     Occupational History     Not on file.     Social History Main Topics     Smoking status: Current Every Day Smoker     Smokeless tobacco: Never Used      Comment: THC hx     Alcohol use Not on file     Drug use: Yes     Special: Marijuana     Sexual activity: Yes     Partners: Male     Birth control/ protection: None     Other Topics Concern     Not on file     Social History Narrative         Review of systems     Pertinent Positives and negatives in HPI.     Physical Exam    /64 (Patient Site: Left Arm, Patient Position: Sitting, Cuff Size: Adult Large)  Pulse 88  Resp 22  Wt 208 lb (94.3 kg)  LMP 09/06/2018 (Exact Date)  BMI 36.27 kg/m2  GEN:  22 y.o. female sitting comfortably in no apparent distress.   HEENT: EOMI, no scleral icterus, buccal mucosa moist  CHEST/LUNG: No respiratory distress  SKIN: warm, dry, no rashes or lesions  NEURO: Gait normal, coordination intact  PSYCH:  Mood and affect appropriate      Anabel Sharma

## 2021-06-21 NOTE — PROGRESS NOTES
[facility] EXAM note      Chief Complaint   Patient presents with     Follow-up     Labs      Possible          Assessment & Plan    Problem List Items Addressed This Visit     None      Visit Diagnoses     Pregnancy    -  Primary: Considering .  And leaning this direction.  Discussed that she will need to go to someplace like [facility] in order to have the  done.  Is preferred to do medical  in the first trimester she has about a month more to decide if she wants to go through with it.  She is about 9 weeks pregnant at this time.  Provided active listening and support.  Let her know she should come see me she does decide to keep it.  Answered all questions patient's satisfaction.          History    Rebecca Salas is a 22 y.o.  female who presents for the following issues:    Patient comes in for follow-up pregnancy.  She states today that she is actually strongly considering the  now.  She was wondering if she could get the pill today and take at home and consider her options.  I informed her that unfortunately we are not able to do abortions at this clinic.  And I was sorry if there was any confusion about that.  I know I had stated earlier appointment she would need [facility] .  She states she is considering possible adoption option for laboy couple that she knows who would be interested in adopting the baby if she did proceed.  She also believes that 1 of her partners would be really good dad and he does want the baby so there is a lot to consider right now.  Ultimately she will go with what is best for her in her life.  She also just wants to make sure what timeframe she had in order to get the medical .        mEDICATIONS    No current outpatient prescriptions on file prior to visit.     No current facility-administered medications on file prior to visit.        Pertinent past medical, surgical, social and  "family history reviewed and updated in [facility].    Social History     Social History     Marital status: Single     Spouse name: N/A     Number of children: N/A     Years of education: N/A     Occupational History     Not on file.     Social History Main Topics     Smoking status: Current Every Day Smoker     Smokeless tobacco: Never Used      Comment: THC hx     Alcohol use Not on file     Drug use: Yes     Special: Marijuana     Sexual activity: Yes     Partners: Male     Birth control/ protection: None     Other Topics Concern     Not on file     Social History Narrative         Review of systems     Pertinent Positives and negatives in HPI.     Physical Exam    /70  Pulse 81  Temp 98.6  F (37  C) (Oral)   Resp 12  Ht 5' 3.5\" (1.613 m)  Wt 212 lb 2 oz (96.2 kg)  LMP 09/07/2018 (Approximate)  SpO2 95%  BMI 36.99 kg/m2  GEN:  22 y.o. female sitting comfortably in no apparent distress.   HEENT: EOMI, no scleral icterus, buccal mucosa moist  CHEST/LUNG: No respiratory distress  NEURO: Gait normal, coordination intact  PSYCH:  Mood and affect appropriate      Anabel Sharma    "

## 2021-06-21 NOTE — PROGRESS NOTES
Please call patient and inform:  Hormone level is showing about a 5-7 week pregnancy Slept well  Med compliant  Pleasant on approach  Voicing no complaints

## 2021-06-21 NOTE — PROGRESS NOTES
Please call patient and inform:  Your US is showing a baby at 7w4d gestation. This matches your LMP and puts the conception date at September 20th. I know this doesn't help you much. But please let us know how you would like to move forward. If you would like to proceede with the pregnancy we will set you up with a 1st OB appt.

## 2021-06-21 NOTE — LETTER
Letter by Amna López RN at      Author: Amna López RN Service: -- Author Type: --    Filed:  Encounter Date: 12/29/2020 Status: (Other)         Rebecca MIRLANDE Josue  [address]  [place] [address]             December 29, 2020         Dear Ms. Salas,    We are happy to inform you that your recent Pap smear is normal.    It is recommended that you have your next Pap smear in 3 years. You will also need to return to the clinic every year for an annual wellness visit.    If you have additional questions regarding this result, please contact our office and we will be happy to assist you.      Sincerely,    Your [facility] Team

## 2021-06-21 NOTE — LETTER
Letter by Donita Neville MD at      Author: Donita Neville MD Service: -- Author Type: --    Filed:  Encounter Date: 2/3/2021 Status: (Other)         Rebceca Salas  [address]  [place] [address]             February 3, 2021         Dear Ms. Salas,    Below are the results from your recent visit:    Resulted Orders   US Breast Right Limited 1-3 Quadrants    Narrative    EXAM: ULTRASOUND BREAST UNILATERAL RIGHT LIMITED  LOCATION: [facility] OUTPATIENT SERVICES  [facility]  DATE/TIME: 2/2/2021 2:18 PM    INDICATION: Lumps in the right breast in the 2:00 and 5:00 position.    COMPARISON: None.    ULTRASOUND FINDINGS: Right breast ultrasound was performed by both the ultrasonographer and the radiologist. The right breast was examined from the 1:00 through the 6:00 position. Only normal-appearing breast tissue was seen with no findings to suggest   malignancy.      Impression    No findings to suggest malignancy. Followup should be based on clinical findings.    ACR BI-RADS Category 1: Negative.    Followup should be based on clinical findings.    The findings and recommendations were discussed with the patient at the time of exam.        Your ultrasound of the breast was normal.     Please call with questions or contact us using [employer].    Sincerely,        Electronically signed by Donita Neville MD

## 2021-06-25 NOTE — TELEPHONE ENCOUNTER
Attempts made to contact patient in regards to appointment that was scheduled for today at 1:30 pm with this writer via video visit.  Patient did not link onto video visit and when attempting to contact patient, phone number was not in service (no alternative number avaialble). Therefore sent patient a message via [employer] as well as an email to clarify phone service and if patient would be interested in rescheduling this appointment at a later date.

## 2021-06-27 ENCOUNTER — HEALTH MAINTENANCE LETTER (OUTPATIENT)
Age: 26
End: 2021-06-27

## 2021-07-06 VITALS
HEART RATE: 72 BPM | SYSTOLIC BLOOD PRESSURE: 112 MMHG | DIASTOLIC BLOOD PRESSURE: 58 MMHG | WEIGHT: 175 LBS | BODY MASS INDEX: 29.57 KG/M2

## 2021-09-08 VITALS
HEART RATE: 78 BPM | SYSTOLIC BLOOD PRESSURE: 178 MMHG | BODY MASS INDEX: 29.53 KG/M2 | OXYGEN SATURATION: 98 % | RESPIRATION RATE: 18 BRPM | HEIGHT: 64 IN | DIASTOLIC BLOOD PRESSURE: 100 MMHG | WEIGHT: 173 LBS | TEMPERATURE: 97.7 F

## 2021-09-08 PROCEDURE — 99283 EMERGENCY DEPT VISIT LOW MDM: CPT

## 2021-09-08 ASSESSMENT — MIFFLIN-ST. JEOR: SCORE: 1514.72

## 2021-09-09 ENCOUNTER — HOSPITAL ENCOUNTER (EMERGENCY)
Facility: HOSPITAL | Age: 26
Discharge: HOME | End: 2021-09-09
Attending: EMERGENCY MEDICINE | Admitting: EMERGENCY MEDICINE
Payer: COMMERCIAL

## 2021-09-09 DIAGNOSIS — K29.00 ACUTE GASTRITIS WITHOUT HEMORRHAGE, UNSPECIFIED GASTRITIS TYPE: ICD-10-CM

## 2021-09-09 LAB
ALBUMIN UR-MCNC: NEGATIVE MG/DL
ANION GAP SERPL CALCULATED.3IONS-SCNC: 10 MMOL/L (ref 5–18)
APPEARANCE UR: CLEAR
BACTERIA #/AREA URNS HPF: ABNORMAL /HPF
BASOPHILS # BLD AUTO: 0 10E3/UL (ref 0–0.2)
BASOPHILS NFR BLD AUTO: 0 %
BILIRUB UR QL STRIP: NEGATIVE
BUN SERPL-MCNC: 11 MG/DL (ref 8–22)
CALCIUM SERPL-MCNC: 9.8 MG/DL (ref 8.5–10.5)
CHLORIDE BLD-SCNC: 103 MMOL/L (ref 98–107)
CO2 SERPL-SCNC: 26 MMOL/L (ref 22–31)
COLOR UR AUTO: ABNORMAL
CREAT SERPL-MCNC: 0.76 MG/DL (ref 0.6–1.1)
EOSINOPHIL # BLD AUTO: 0.2 10E3/UL (ref 0–0.7)
EOSINOPHIL NFR BLD AUTO: 2 %
ERYTHROCYTE [DISTWIDTH] IN BLOOD BY AUTOMATED COUNT: 12.4 % (ref 10–15)
GFR SERPL CREATININE-BSD FRML MDRD: >90 ML/MIN/1.73M2
GLUCOSE BLD-MCNC: 86 MG/DL (ref 70–125)
GLUCOSE UR STRIP-MCNC: NEGATIVE MG/DL
HCT VFR BLD AUTO: 40 % (ref 35–47)
HGB BLD-MCNC: 13.2 G/DL (ref 11.7–15.7)
HGB UR QL STRIP: NEGATIVE
IMM GRANULOCYTES # BLD: 0 10E3/UL
IMM GRANULOCYTES NFR BLD: 0 %
KETONES UR STRIP-MCNC: NEGATIVE MG/DL
LEUKOCYTE ESTERASE UR QL STRIP: ABNORMAL
LYMPHOCYTES # BLD AUTO: 4.2 10E3/UL (ref 0.8–5.3)
LYMPHOCYTES NFR BLD AUTO: 44 %
MCH RBC QN AUTO: 31.1 PG (ref 26.5–33)
MCHC RBC AUTO-ENTMCNC: 33 G/DL (ref 31.5–36.5)
MCV RBC AUTO: 94 FL (ref 78–100)
MONOCYTES # BLD AUTO: 0.7 10E3/UL (ref 0–1.3)
MONOCYTES NFR BLD AUTO: 8 %
MUCOUS THREADS #/AREA URNS LPF: PRESENT /LPF
NEUTROPHILS # BLD AUTO: 4.5 10E3/UL (ref 1.6–8.3)
NEUTROPHILS NFR BLD AUTO: 46 %
NITRATE UR QL: NEGATIVE
NRBC # BLD AUTO: 0 10E3/UL
NRBC BLD AUTO-RTO: 0 /100
PH UR STRIP: 6 [PH] (ref 5–7)
PLAT MORPH BLD: NORMAL
PLATELET # BLD AUTO: 288 10E3/UL (ref 150–450)
POTASSIUM BLD-SCNC: 3.8 MMOL/L (ref 3.5–5)
RBC # BLD AUTO: 4.24 10E6/UL (ref 3.8–5.2)
RBC MORPH BLD: NORMAL
RBC URINE: 4 /HPF
SODIUM SERPL-SCNC: 139 MMOL/L (ref 136–145)
SP GR UR STRIP: 1.02 (ref 1–1.03)
SQUAMOUS EPITHELIAL: 4 /HPF
UROBILINOGEN UR STRIP-MCNC: <2 MG/DL
WBC # BLD AUTO: 9.8 10E3/UL (ref 4–11)
WBC URINE: 7 /HPF

## 2021-09-09 PROCEDURE — 85025 COMPLETE CBC W/AUTO DIFF WBC: CPT | Performed by: EMERGENCY MEDICINE

## 2021-09-09 PROCEDURE — 80048 BASIC METABOLIC PNL TOTAL CA: CPT | Performed by: STUDENT IN AN ORGANIZED HEALTH CARE EDUCATION/TRAINING PROGRAM

## 2021-09-09 PROCEDURE — 250N000009 HC RX 250: Performed by: EMERGENCY MEDICINE

## 2021-09-09 PROCEDURE — 81001 URINALYSIS AUTO W/SCOPE: CPT | Performed by: EMERGENCY MEDICINE

## 2021-09-09 PROCEDURE — 250N000013 HC RX MED GY IP 250 OP 250 PS 637: Performed by: EMERGENCY MEDICINE

## 2021-09-09 PROCEDURE — 80048 BASIC METABOLIC PNL TOTAL CA: CPT | Performed by: EMERGENCY MEDICINE

## 2021-09-09 PROCEDURE — 36415 COLL VENOUS BLD VENIPUNCTURE: CPT | Performed by: STUDENT IN AN ORGANIZED HEALTH CARE EDUCATION/TRAINING PROGRAM

## 2021-09-09 PROCEDURE — 85025 COMPLETE CBC W/AUTO DIFF WBC: CPT | Performed by: STUDENT IN AN ORGANIZED HEALTH CARE EDUCATION/TRAINING PROGRAM

## 2021-09-09 PROCEDURE — 81001 URINALYSIS AUTO W/SCOPE: CPT | Performed by: STUDENT IN AN ORGANIZED HEALTH CARE EDUCATION/TRAINING PROGRAM

## 2021-09-09 RX ORDER — FAMOTIDINE 40 MG/1
40 TABLET, FILM COATED ORAL 2 TIMES DAILY
Qty: 60 TABLET | Refills: 0 | Status: SHIPPED | OUTPATIENT
Start: 2021-09-09 | End: 2023-04-17

## 2021-09-09 RX ADMIN — LIDOCAINE HYDROCHLORIDE 30 ML: 20 SOLUTION ORAL; TOPICAL at 01:46

## 2021-09-09 ASSESSMENT — ENCOUNTER SYMPTOMS
FREQUENCY: 0
FEVER: 0
DIFFICULTY URINATING: 0
DYSURIA: 0
HEMATURIA: 0
DIARRHEA: 0
BLOOD IN STOOL: 0
VOMITING: 0
NAUSEA: 0
ABDOMINAL PAIN: 1
CONSTIPATION: 0

## 2021-09-09 NOTE — ED PROVIDER NOTES
EMERGENCY DEPARTMENT ENCOUNTER      NAME: Rebecca Salas  AGE: 25 year old female  YOB: 1995  MRN: [id number]  EVALUATION DATE & TIME: 9/9/2021  1:26 AM    PCP: Anabel Sharma    ED PROVIDER: Tyson Tidwell M.D.      Chief Complaint   Patient presents with     Abdominal Pain         FINAL IMPRESSION:  1. Acute gastritis without hemorrhage, unspecified gastritis type          ED COURSE & MEDICAL DECISION MAKING:    Pertinent Labs & Imaging studies reviewed. (See chart for details)  25 year old female presents to the Emergency Department for evaluation of upper abdominal pain.  Seems most consistent with reflux.  Given a GI cocktail with some improvement.  No right upper quadrant tenderness.  Do not suspect gallbladder disease.  Urinalysis is normal.  No signs of kidney stone.  I do not think further imaging is necessary.  This is not cardiac.  Will have the patient start on famotidine.  Follow-up with primary.  Did give a referral to GI if she does not improve.  Return for worsening symptoms.  .    1:31 AM I met with the patient for the initial interview and physical examination. Discussed plan for treatment and workup in the ED. PPE: Provider wore gloves and surgical mask.    At the conclusion of the encounter I discussed the results of all of the tests and the disposition. The questions were answered. The patient or family acknowledged understanding and was agreeable with the care plan.           MEDICATIONS GIVEN IN THE EMERGENCY:  Medications   lidocaine (XYLOCAINE) 2 % 15 mL, alum & mag hydroxide-simethicone (MAALOX) 15 mL GI Cocktail (has no administration in time range)       NEW PRESCRIPTIONS STARTED AT TODAY'S ER VISIT  New Prescriptions    FAMOTIDINE (PEPCID) 40 MG TABLET    Take 1 tablet (40 mg) by mouth 2 times daily          =================================================================    HPI    Patient information was obtained from: patient    Use of : N/A         Rebecca Salas is a 25 year old female with a pertinent history of ovarian cysts who presents to this ED accompanied by boyfriend for evaluation of abdominal pain.     Patient reports 2 weeks of upper abdominal pain, which is unchanged with eating. Pain is intermittently sharp and shoots from the umbilical region to the RUQ/upper right flank area. Currently reports pain in the epigastric region. No history of abdominal surgeries. Otherwise denies nausea, vomiting, urinary symptoms, stool changes, fever, or any other complaints at this time. No sick contacts. Denies chance of pregnancy. No known allergies to medications.       REVIEW OF SYSTEMS   Review of Systems   Constitutional: Negative for fever.   Gastrointestinal: Positive for abdominal pain (upper). Negative for blood in stool, constipation, diarrhea, nausea and vomiting.   Genitourinary: Negative for difficulty urinating, dysuria, frequency and hematuria.   All other systems reviewed and are negative.       PAST MEDICAL HISTORY:  Past Medical History:   Diagnosis Date      (normal spontaneous vaginal delivery) 2016     Supervision of high risk pregnancy in third trimester 2016    Overview:  MPP  TESTING ONLY   NEXT VISIT ALERTS:  More testing?-order unclear    PRIMARY DIAGNOSIS: 20 y.o.        Estimated Date of Delivery: 16  Elevated BP Daily THC use-to control nausea, sleep and coping with anxiety Depression-on Zoloft H/O smoking  TESTING PLANS:   BPP/NST  LAST GROWTH:   PRIMARY PHYSICIAN/PHONE:  Dr White [id number]  PERTINENT LABS: B+ 11/2/15, 3/2/16, 16        PAST SURGICAL HISTORY:  Past Surgical History:   Procedure Laterality Date     TONSILLECTOMY             CURRENT MEDICATIONS:    Current Facility-Administered Medications   Medication     lidocaine (XYLOCAINE) 2 % 15 mL, alum & mag hydroxide-simethicone (MAALOX) 15 mL GI Cocktail     Current Outpatient Medications   Medication     famotidine (PEPCID) 40 MG  "tablet     phentermine 15 MG capsule     topiramate (TOPAMAX) 25 MG tablet         ALLERGIES:  Allergies   Allergen Reactions     Nickel Unknown       FAMILY HISTORY:  Family History   Problem Relation Age of Onset     Heart Disease Mother         has had surgery, unknown diagnosis     Osteoarthritis Mother         multiple surgeries on knees     Hypertension Mother      Lupus Mother      Sleep Apnea Mother      Asthma Mother      Deep Vein Thrombosis Mother      Thyroid Disease Brother         s/p ablation     Hypertension Brother      Dementia Maternal Grandfather      Coronary Artery Disease Maternal Grandfather      Cerebrovascular Disease Maternal Grandfather      Diabetes Type 2  Maternal Grandfather      Hypertension Maternal Grandfather      Deep Vein Thrombosis Maternal Grandfather      Acute Myocardial Infarction Paternal Grandmother      Mental Illness Paternal Grandfather         suicide     No Known Problems Daughter      Colon Cancer Paternal Aunt      Breast Cancer No family hx of        SOCIAL HISTORY:   Social History     Socioeconomic History     Marital status: Single     Spouse name: Not on file     Number of children: Not on file     Years of education: Not on file     Highest education level: Not on file   Occupational History     Not on file   Tobacco Use     Smoking status: Current Every Day Smoker     Packs/day: 0.50     Types: Cigarettes     Smokeless tobacco: Never Used     Tobacco comment: vapes   Substance and Sexual Activity     Alcohol use: Not Currently     Comment: Alcoholic Drinks/day: \"not really\"     Drug use: Yes     Types: Marijuana     Comment: Drug use: smokes daily     Sexual activity: Not Currently     Partners: Male     Birth control/protection: None   Other Topics Concern     Not on file   Social History Narrative    Lives with mom       Social Determinants of Health     Financial Resource Strain:      Difficulty of Paying Living Expenses:    Food Insecurity:      Worried " "About Running Out of Food in the Last Year:      Ran Out of Food in the Last Year:    Transportation Needs:      Lack of Transportation (Medical):      Lack of Transportation (Non-Medical):    Physical Activity:      Days of Exercise per Week:      Minutes of Exercise per Session:    Stress:      Feeling of Stress :    Social Connections:      Frequency of Communication with Friends and Family:      Frequency of Social Gatherings with Friends and Family:      Attends Muslim Services:      Active Member of Clubs or Organizations:      Attends Club or Organization Meetings:      Marital Status:    Intimate Partner Violence:      Fear of Current or Ex-Partner:      Emotionally Abused:      Physically Abused:      Sexually Abused:        VITALS:  BP (!) 178/100   Pulse 78   Temp 97.7  F (36.5  C) (Temporal)   Resp 18   Ht 1.626 m (5' 4\")   Wt 78.5 kg (173 lb)   LMP 09/03/2021   SpO2 98%   BMI 29.70 kg/m      PHYSICAL EXAM    Physical Exam  Constitutional:       General: She is not in acute distress.     Appearance: She is not diaphoretic.   HENT:      Head: Atraumatic.      Mouth/Throat:      Pharynx: No oropharyngeal exudate.   Eyes:      General: No scleral icterus.     Pupils: Pupils are equal, round, and reactive to light.   Cardiovascular:      Heart sounds: Normal heart sounds.   Pulmonary:      Effort: No respiratory distress.      Breath sounds: Normal breath sounds.   Abdominal:      Palpations: Abdomen is soft.      Tenderness: There is no abdominal tenderness. There is no guarding or rebound.   Musculoskeletal:         General: No tenderness.   Skin:     General: Skin is warm.      Findings: No rash.   Neurological:      General: No focal deficit present.      Mental Status: She is alert.           LAB:  All pertinent labs reviewed and interpreted.  Labs Ordered and Resulted from Time of ED Arrival Up to the Time of Departure from the ED   ROUTINE UA WITH MICROSCOPIC REFLEX TO CULTURE - Abnormal; " Notable for the following components:       Result Value    Leukocyte Esterase Urine 75 Jean-Paul/uL (*)     Bacteria Urine Few (*)     Mucus Urine Present (*)     RBC Urine 4 (*)     WBC Urine 7 (*)     Squamous Epithelials Urine 4 (*)     All other components within normal limits    Narrative:     Urine Culture not indicated   BASIC METABOLIC PANEL - Normal   CBC WITH PLATELETS & DIFFERENTIAL    Narrative:     The following orders were created for panel order CBC with Platelets & Differential.  Procedure                               Abnormality         Status                     ---------                               -----------         ------                     CBC with platelets and d...[[id number]]                      Final result               RBC and Platelet Morphology[[id number]]                      Final result                 Please view results for these tests on the individual orders.   CBC WITH PLATELETS AND DIFFERENTIAL   RBC AND PLATELET MORPHOLOGY       RADIOLOGY:  Reviewed all pertinent imaging. Please see official radiology report.  No orders to display           I, Chinyere Villalobos, am serving as a scribe to document services personally performed by Dr. Tyson Tidwell, based on my observation and the provider's statements to me. I, Tyson Tidwell MD attest that Chinyere Villalobos is acting in a scribe capacity, has observed my performance of the services and has documented them in accordance with my direction.    Tyson Tidwell M.D.  Emergency Medicine  [facility] EMERGENCY DEPARTMENT  [facility] [facility] [id number] [phone]  Dept: [phone]     Tyson Tidwell MD  09/09/21 0419

## 2021-09-09 NOTE — ED TRIAGE NOTES
Female patient presents to ED with 2 week history of intermittent upper abdominal pain.  No other symptoms.  Nothing taken for the pain.

## 2021-09-28 ENCOUNTER — TRANSFERRED RECORDS (OUTPATIENT)
Dept: HEALTH INFORMATION MANAGEMENT | Facility: CLINIC | Age: 26
End: 2021-09-28

## 2021-09-28 LAB
ALT SERPL-CCNC: 12 LU/L (ref 0–32)
AST SERPL-CCNC: 13 LU/L (ref 0–40)

## 2021-10-08 ENCOUNTER — TRANSFERRED RECORDS (OUTPATIENT)
Dept: HEALTH INFORMATION MANAGEMENT | Facility: CLINIC | Age: 26
End: 2021-10-08

## 2021-10-17 ENCOUNTER — HEALTH MAINTENANCE LETTER (OUTPATIENT)
Age: 26
End: 2021-10-17

## 2021-10-26 ENCOUNTER — TRANSFERRED RECORDS (OUTPATIENT)
Dept: HEALTH INFORMATION MANAGEMENT | Facility: CLINIC | Age: 26
End: 2021-10-26

## 2022-02-06 ENCOUNTER — HEALTH MAINTENANCE LETTER (OUTPATIENT)
Age: 27
End: 2022-02-06

## 2022-05-10 ENCOUNTER — OFFICE VISIT (OUTPATIENT)
Dept: FAMILY MEDICINE | Facility: CLINIC | Age: 27
End: 2022-05-10
Payer: COMMERCIAL

## 2022-05-10 VITALS
WEIGHT: 201 LBS | BODY MASS INDEX: 34.5 KG/M2 | HEART RATE: 87 BPM | RESPIRATION RATE: 12 BRPM | SYSTOLIC BLOOD PRESSURE: 148 MMHG | DIASTOLIC BLOOD PRESSURE: 82 MMHG

## 2022-05-10 DIAGNOSIS — Z30.016 ENCOUNTER FOR INITIAL PRESCRIPTION OF TRANSDERMAL PATCH HORMONAL CONTRACEPTIVE DEVICE: ICD-10-CM

## 2022-05-10 DIAGNOSIS — Z00.00 HEALTHCARE MAINTENANCE: ICD-10-CM

## 2022-05-10 DIAGNOSIS — R03.0 ELEVATED BLOOD PRESSURE READING WITHOUT DIAGNOSIS OF HYPERTENSION: Primary | ICD-10-CM

## 2022-05-10 DIAGNOSIS — Z71.6 ENCOUNTER FOR SMOKING CESSATION COUNSELING: ICD-10-CM

## 2022-05-10 DIAGNOSIS — J06.9 UPPER RESPIRATORY TRACT INFECTION, UNSPECIFIED TYPE: ICD-10-CM

## 2022-05-10 DIAGNOSIS — E78.5 DYSLIPIDEMIA: ICD-10-CM

## 2022-05-10 LAB
ANION GAP SERPL CALCULATED.3IONS-SCNC: 12 MMOL/L (ref 5–18)
BUN SERPL-MCNC: 9 MG/DL (ref 8–22)
CALCIUM SERPL-MCNC: 9.1 MG/DL (ref 8.5–10.5)
CHLORIDE BLD-SCNC: 104 MMOL/L (ref 98–107)
CLUE CELLS: PRESENT
CO2 SERPL-SCNC: 23 MMOL/L (ref 22–31)
CREAT SERPL-MCNC: 0.73 MG/DL (ref 0.6–1.1)
GFR SERPL CREATININE-BSD FRML MDRD: >90 ML/MIN/1.73M2
GLUCOSE BLD-MCNC: 85 MG/DL (ref 70–125)
HIV 1+2 AB+HIV1 P24 AG SERPL QL IA: NEGATIVE
POTASSIUM BLD-SCNC: 3.9 MMOL/L (ref 3.5–5)
SODIUM SERPL-SCNC: 139 MMOL/L (ref 136–145)
TRICHOMONAS, WET PREP: ABNORMAL
TSH SERPL DL<=0.005 MIU/L-ACNC: 2.63 UIU/ML (ref 0.3–5)
WBC'S/HIGH POWER FIELD, WET PREP: ABNORMAL
YEAST, WET PREP: ABNORMAL

## 2022-05-10 PROCEDURE — U0005 INFEC AGEN DETEC AMPLI PROBE: HCPCS | Performed by: FAMILY MEDICINE

## 2022-05-10 PROCEDURE — 86706 HEP B SURFACE ANTIBODY: CPT | Performed by: FAMILY MEDICINE

## 2022-05-10 PROCEDURE — 84443 ASSAY THYROID STIM HORMONE: CPT | Performed by: FAMILY MEDICINE

## 2022-05-10 PROCEDURE — 87340 HEPATITIS B SURFACE AG IA: CPT | Performed by: FAMILY MEDICINE

## 2022-05-10 PROCEDURE — 86803 HEPATITIS C AB TEST: CPT | Performed by: FAMILY MEDICINE

## 2022-05-10 PROCEDURE — 99214 OFFICE O/P EST MOD 30 MIN: CPT | Performed by: FAMILY MEDICINE

## 2022-05-10 PROCEDURE — 86780 TREPONEMA PALLIDUM: CPT | Performed by: FAMILY MEDICINE

## 2022-05-10 PROCEDURE — 87591 N.GONORRHOEAE DNA AMP PROB: CPT | Performed by: FAMILY MEDICINE

## 2022-05-10 PROCEDURE — 80048 BASIC METABOLIC PNL TOTAL CA: CPT | Performed by: FAMILY MEDICINE

## 2022-05-10 PROCEDURE — 36415 COLL VENOUS BLD VENIPUNCTURE: CPT | Performed by: FAMILY MEDICINE

## 2022-05-10 PROCEDURE — U0003 INFECTIOUS AGENT DETECTION BY NUCLEIC ACID (DNA OR RNA); SEVERE ACUTE RESPIRATORY SYNDROME CORONAVIRUS 2 (SARS-COV-2) (CORONAVIRUS DISEASE [COVID-19]), AMPLIFIED PROBE TECHNIQUE, MAKING USE OF HIGH THROUGHPUT TECHNOLOGIES AS DESCRIBED BY CMS-2020-01-R: HCPCS | Performed by: FAMILY MEDICINE

## 2022-05-10 PROCEDURE — 87210 SMEAR WET MOUNT SALINE/INK: CPT | Performed by: FAMILY MEDICINE

## 2022-05-10 PROCEDURE — 87491 CHLMYD TRACH DNA AMP PROBE: CPT | Performed by: FAMILY MEDICINE

## 2022-05-10 PROCEDURE — 87389 HIV-1 AG W/HIV-1&-2 AB AG IA: CPT | Performed by: FAMILY MEDICINE

## 2022-05-10 RX ORDER — NICOTINE 21 MG/24HR
1 PATCH, TRANSDERMAL 24 HOURS TRANSDERMAL EVERY 24 HOURS
Qty: 30 PATCH | Refills: 0 | Status: SHIPPED | OUTPATIENT
Start: 2022-05-10 | End: 2022-11-22

## 2022-05-10 RX ORDER — NORELGESTROMIN AND ETHINYL ESTRADIOL 35; 150 UG/MG; UG/MG
PATCH TRANSDERMAL
Qty: 12 PATCH | Refills: 0 | Status: SHIPPED | OUTPATIENT
Start: 2022-05-10 | End: 2023-04-17

## 2022-05-10 ASSESSMENT — PATIENT HEALTH QUESTIONNAIRE - PHQ9: SUM OF ALL RESPONSES TO PHQ QUESTIONS 1-9: 10

## 2022-05-10 NOTE — PROGRESS NOTES
"Assessment/ Plan  1. Elevated blood pressure reading without diagnosis of hypertension  Indeed I failed to notice this when patient was here today.  I left her a message, particularly with contraception prescription, to come back to get blood pressure checked in 3 to 4 weeks.  - UA with Microscopic reflex to Culture - Clinic Collect  - Basic metabolic panel  (Ca, Cl, CO2, Creat, Gluc, K, Na, BUN); Future  - Basic metabolic panel  (Ca, Cl, CO2, Creat, Gluc, K, Na, BUN)    2. Dyslipidemia  Recheck lipids    3. Healthcare maintenance  Declines COVID shot  STD check  - Hepatitis C antibody; Future  - HIV Antigen Antibody Combo; Future  - Hepatitis B surface antigen; Future  - Hepatitis B Surface Antibody; Future  - Treponema Abs w Reflex to RPR and Titer; Future  - Hepatitis C antibody  - HIV Antigen Antibody Combo  - Hepatitis B surface antigen  - Hepatitis B Surface Antibody  - Treponema Abs w Reflex to RPR and Titer  - Wet prep - Clinic Collect  - Chlamydia trachomatis/Neisseria gonorrhoeae by PCR    4. Upper respiratory tract infection, unspecified type  URI today, no fever.  - Symptomatic; Unknown COVID-19 Virus (Coronavirus) by PCR Nose    5. Encounter for smoking cessation counseling  Mostly vaping, light smoking.  Requesting patch  Discussed \"quit plan  - nicotine (NICODERM CQ) 14 MG/24HR 24 hr patch; Place 1 patch onto the skin every 24 hours  Dispense: 30 patch; Refill: 0    6. Encounter for initial prescription of transdermal patch hormonal contraceptive device  As above, borderline elevated blood pressure, needs to recheck.  Smokes occasionally, mostly vaping.  Encouraged her to quit.  Follow-up 3 to 4 weeks to recheck blood pressure, discussed  - norelgestromin-ethinyl estradiol (ORTHO EVRA) 150-35 MCG/24HR patch; Remove old patch and apply new patch onto the skin once a week for 3 weeks (21 days). Do not wear patch week 4 (days 22-28), then repeat.  Dispense: 12 patch; Refill: 0    Body mass index is 34.5 " kg/m .    Subjective  CC:  chief complaint  HPI:  26-year-old  Here primarily for STD test  Stable partner, does not use condoms, no birth control.  Concerned the birth control will cause weight gain.  Had a bad experience with IUD in the past.      Complains that she has had some vaginal discharge, which she thought was BV about 2 weeks ago.  Now symptoms better.  Use some over-the-counter remedies which seem to help.      Occasionally smokes a cigarette, vapes every day but only light.  Interested in quitting this.  She quit during her pregnancy with her 5-year-old.  Patient Active Problem List   Diagnosis     Ovarian cyst     Overweight (BMI 25.0-29.9)     Dyslipidemia     Snores     Adjustment reaction with mixed disturbance of emotions and conduct     Depressive disorder     Current medications reviewed as follows:  famotidine (PEPCID) 40 MG tablet, Take 1 tablet (40 mg) by mouth 2 times daily  phentermine 15 MG capsule, [PHENTERMINE 15 MG CAPSULE] Take 1 capsule (15 mg total) by mouth every morning.  topiramate (TOPAMAX) 25 MG tablet, [TOPIRAMATE (TOPAMAX) 25 MG TABLET] Take 1 tablet (25 mg total) by mouth 2 (two) times a day. (1 tablet/night for first 7 days.  Add AM dose if no side effects)    No current facility-administered medications on file prior to visit.     History   Smoking Status     Current Every Day Smoker     Packs/day: 0.50     Types: Cigarettes   Smokeless Tobacco     Never Used     Comment: vapes     Social History     Social History Narrative    Lives with mom       Patient Care Team:  Anabel Sharma DO as PCP - General  Anabel Sharma DO as Assigned PCP  ROS  As above      Objective  Physical Exam  Vitals:    05/10/22 1702   BP: (!) 148/82   BP Location: Right arm   Patient Position: Sitting   Cuff Size: Adult Regular   Pulse: 87   Resp: 12   Weight: 91.2 kg (201 lb)     External genitalia appear normal.  Normal labia minora and majora.  No lesion.  Speculum used to examine vaginal vault  which has a normal appearance with physiologic discharge with a little bit of blood.  She is just getting over her menses.  Cervix well visualized and appears normal. Diagnostics  Pending    Please note: Voice recognition software was used in this dictation.  It may therefore contain typographical errors.      Answers for HPI/ROS submitted by the patient on 5/10/2022  Frequency of exercise:: None  Getting at least 3 servings of Calcium per day:: NO  Diet:: Regular (no restrictions)  Taking medications regularly:: Not Applicable  Medication side effects:: Not applicable  Bi-annual eye exam:: Yes  Dental care twice a year:: Yes  Sleep apnea or symptoms of sleep apnea:: None, Daytime drowsiness  Additional concerns today:: No

## 2022-05-11 DIAGNOSIS — A74.9 CHLAMYDIA: Primary | ICD-10-CM

## 2022-05-11 LAB
C TRACH DNA SPEC QL PROBE+SIG AMP: POSITIVE
HBV SURFACE AB SERPLBLD QL IA.RAPID: NEGATIVE
HBV SURFACE AG SERPL QL IA: NONREACTIVE
HCV AB SERPL QL IA: NEGATIVE
N GONORRHOEA DNA SPEC QL NAA+PROBE: NEGATIVE
SARS-COV-2 RNA RESP QL NAA+PROBE: NEGATIVE
T PALLIDUM AB SER QL: NONREACTIVE

## 2022-05-11 RX ORDER — AZITHROMYCIN 500 MG/1
TABLET, FILM COATED ORAL
Qty: 2 TABLET | Refills: 0 | Status: SHIPPED | OUTPATIENT
Start: 2022-05-11 | End: 2023-04-17

## 2022-06-16 ENCOUNTER — TELEPHONE (OUTPATIENT)
Dept: FAMILY MEDICINE | Facility: CLINIC | Age: 27
End: 2022-06-16
Payer: COMMERCIAL

## 2022-06-16 NOTE — TELEPHONE ENCOUNTER
Reason for Call:  Other Letter to work to excuse    Detailed comments: Patient called to request provider to write a letter to excuse her from work today. She pulled a back muscle and went to the Chiropractor yesterday. She took today off to rest. Please call patient when letter is ready for .    Phone Number Patient can be reached at: Home number on file [phone] (home)    Best Time: any    Can we leave a detailed message on this number? YES    Call taken on 6/16/2022 at 9:25 AM by Digna Calvo

## 2022-06-16 NOTE — TELEPHONE ENCOUNTER
"Called and left message#1 for patient to return call to clinic for message. \" Okay to relay message\"    "

## 2022-06-16 NOTE — TELEPHONE ENCOUNTER
Pt called back and I relayed message below. Pt verbalized understanding and has no further questions or concerns.

## 2022-06-30 ENCOUNTER — TELEPHONE (OUTPATIENT)
Dept: FAMILY MEDICINE | Facility: CLINIC | Age: 27
End: 2022-06-30

## 2022-06-30 DIAGNOSIS — A74.9 CHLAMYDIA: Primary | ICD-10-CM

## 2022-06-30 NOTE — TELEPHONE ENCOUNTER
Reason for Call:  Other appointment    Detailed comments: Patient called to request if she can be DB to be seen for a STI screening. She recently tested positive for chlamydia and she would like to be seen to be retested. The first opening is 07/21/2022 and patient stated it was too long to wait to be seen. Please call patient to advise if she can be DB or could she wait until 07/21/2022.    Phone Number Patient can be reached at: Home number on file [phone] (home)    Best Time: any    Can we leave a detailed message on this number? YES    Call taken on 6/30/2022 at 3:55 PM by Digna Calvo

## 2022-07-01 ENCOUNTER — LAB (OUTPATIENT)
Dept: LAB | Facility: CLINIC | Age: 27
End: 2022-07-01
Payer: COMMERCIAL

## 2022-07-01 DIAGNOSIS — A74.9 CHLAMYDIA: ICD-10-CM

## 2022-07-01 PROCEDURE — 87491 CHLMYD TRACH DNA AMP PROBE: CPT

## 2022-07-01 PROCEDURE — 87591 N.GONORRHOEAE DNA AMP PROB: CPT

## 2022-07-02 LAB
C TRACH DNA SPEC QL PROBE+SIG AMP: NEGATIVE
N GONORRHOEA DNA SPEC QL NAA+PROBE: NEGATIVE

## 2022-10-02 ENCOUNTER — HEALTH MAINTENANCE LETTER (OUTPATIENT)
Age: 27
End: 2022-10-02

## 2022-10-10 ENCOUNTER — DOCUMENTATION ONLY (OUTPATIENT)
Dept: LAB | Facility: CLINIC | Age: 27
End: 2022-10-10

## 2022-10-10 ENCOUNTER — TELEPHONE (OUTPATIENT)
Dept: FAMILY MEDICINE | Facility: CLINIC | Age: 27
End: 2022-10-10

## 2022-10-10 NOTE — TELEPHONE ENCOUNTER
Order/Referral Request    Who is requesting: Patient    Orders being requested: STD check could not get an apt within pt time frame; is aware may need an apt    Reason service is needed/diagnosis: wants it checked    When are orders needed by: Tuesday if possible; does know it will be cancelled if not able to     Has this been discussed with Provider: No    Does patient have a preference on a Group/Provider/Facility? No    Does patient have an appointment scheduled?: No    Where to send orders: phone call    Could we send this information to you in [place] or would you prefer to receive a phone call?:   Patient would prefer a phone call   Okay to leave a detailed message?: Yes at Home number on file [phone] (home)

## 2022-10-10 NOTE — PROGRESS NOTES
She needs to have some sort of visit at least a virtual visit. She can't just schedule an STD check.

## 2022-10-10 NOTE — PROGRESS NOTES
Laminelissette NOGUEIRA Josue has an upcoming lab appointment:    Future Appointments   Date Time Provider Department Center   10/11/2022  3:45 PM [facility]     Patient is scheduled for the following lab(s): STD check (sent message)    There is no order available. Please review and place either future orders or HMPO (Review of Health Maintenance Protocol Orders), as appropriate.    There are no preventive care reminders to display for this patient.  Misael Faulkner

## 2022-10-11 ENCOUNTER — APPOINTMENT (OUTPATIENT)
Dept: LAB | Facility: CLINIC | Age: 27
End: 2022-10-11
Payer: COMMERCIAL

## 2022-10-11 DIAGNOSIS — Z11.3 VENEREAL DISEASE SCREENING: Primary | ICD-10-CM

## 2022-10-11 PROCEDURE — 87591 N.GONORRHOEAE DNA AMP PROB: CPT | Performed by: FAMILY MEDICINE

## 2022-10-11 PROCEDURE — 87491 CHLMYD TRACH DNA AMP PROBE: CPT | Performed by: FAMILY MEDICINE

## 2022-10-11 NOTE — TELEPHONE ENCOUNTER
Routing to provider. Please put in orders if appropriate as patient has lab appt today at 3:45 pm.

## 2022-10-12 DIAGNOSIS — Z00.00 HEALTHCARE MAINTENANCE: Primary | ICD-10-CM

## 2022-10-12 NOTE — TELEPHONE ENCOUNTER
Lab ordered and in process as of 10/11/22    Chlamydia trachomatis/Neisseria gonorrhoeae by PCR - Clinic Collect [[id number]] (Order [id number]      No further action needed.    Ilana Silva RN  [facility]

## 2022-10-13 LAB
C TRACH DNA SPEC QL PROBE+SIG AMP: NEGATIVE
N GONORRHOEA DNA SPEC QL NAA+PROBE: NEGATIVE

## 2022-10-20 ENCOUNTER — OFFICE VISIT (OUTPATIENT)
Dept: FAMILY MEDICINE | Facility: CLINIC | Age: 27
End: 2022-10-20
Payer: COMMERCIAL

## 2022-10-20 VITALS
TEMPERATURE: 97.8 F | RESPIRATION RATE: 18 BRPM | BODY MASS INDEX: 32.61 KG/M2 | OXYGEN SATURATION: 100 % | HEART RATE: 82 BPM | SYSTOLIC BLOOD PRESSURE: 155 MMHG | WEIGHT: 190 LBS | DIASTOLIC BLOOD PRESSURE: 99 MMHG

## 2022-10-20 DIAGNOSIS — N97.9 FEMALE INFERTILITY: ICD-10-CM

## 2022-10-20 DIAGNOSIS — Z00.00 HEALTHCARE MAINTENANCE: ICD-10-CM

## 2022-10-20 DIAGNOSIS — I10 ESSENTIAL HYPERTENSION: ICD-10-CM

## 2022-10-20 DIAGNOSIS — E78.5 DYSLIPIDEMIA: ICD-10-CM

## 2022-10-20 DIAGNOSIS — N94.6 DYSMENORRHEA: ICD-10-CM

## 2022-10-20 DIAGNOSIS — R03.0 ELEVATED BLOOD PRESSURE READING WITHOUT DIAGNOSIS OF HYPERTENSION: Primary | ICD-10-CM

## 2022-10-20 LAB
ALBUMIN UR-MCNC: NEGATIVE MG/DL
ANION GAP SERPL CALCULATED.3IONS-SCNC: 13 MMOL/L (ref 7–15)
APPEARANCE UR: CLEAR
BILIRUB UR QL STRIP: NEGATIVE
BUN SERPL-MCNC: 10.8 MG/DL (ref 6–20)
CALCIUM SERPL-MCNC: 9.5 MG/DL (ref 8.6–10)
CHLORIDE SERPL-SCNC: 103 MMOL/L (ref 98–107)
COLOR UR AUTO: YELLOW
CREAT SERPL-MCNC: 0.66 MG/DL (ref 0.51–0.95)
DEPRECATED HCO3 PLAS-SCNC: 26 MMOL/L (ref 22–29)
GFR SERPL CREATININE-BSD FRML MDRD: >90 ML/MIN/1.73M2
GLUCOSE SERPL-MCNC: 76 MG/DL (ref 70–99)
GLUCOSE UR STRIP-MCNC: NEGATIVE MG/DL
HCG UR QL: NEGATIVE
HGB UR QL STRIP: NEGATIVE
KETONES UR STRIP-MCNC: NEGATIVE MG/DL
LEUKOCYTE ESTERASE UR QL STRIP: NEGATIVE
NITRATE UR QL: NEGATIVE
PH UR STRIP: 7 [PH] (ref 5–8)
POTASSIUM SERPL-SCNC: 3.8 MMOL/L (ref 3.4–5.3)
PROLACTIN SERPL 3RD IS-MCNC: 14 NG/ML (ref 5–23)
SODIUM SERPL-SCNC: 142 MMOL/L (ref 136–145)
SP GR UR STRIP: 1.02 (ref 1–1.03)
TSH SERPL DL<=0.005 MIU/L-ACNC: 1.65 UIU/ML (ref 0.3–4.2)
UROBILINOGEN UR STRIP-ACNC: 0.2 E.U./DL

## 2022-10-20 PROCEDURE — 84146 ASSAY OF PROLACTIN: CPT | Performed by: FAMILY MEDICINE

## 2022-10-20 PROCEDURE — 80048 BASIC METABOLIC PNL TOTAL CA: CPT | Performed by: FAMILY MEDICINE

## 2022-10-20 PROCEDURE — 81003 URINALYSIS AUTO W/O SCOPE: CPT | Performed by: FAMILY MEDICINE

## 2022-10-20 PROCEDURE — 81025 URINE PREGNANCY TEST: CPT | Performed by: FAMILY MEDICINE

## 2022-10-20 PROCEDURE — 99214 OFFICE O/P EST MOD 30 MIN: CPT | Performed by: FAMILY MEDICINE

## 2022-10-20 PROCEDURE — 36415 COLL VENOUS BLD VENIPUNCTURE: CPT | Performed by: FAMILY MEDICINE

## 2022-10-20 PROCEDURE — 86780 TREPONEMA PALLIDUM: CPT | Performed by: FAMILY MEDICINE

## 2022-10-20 PROCEDURE — 87389 HIV-1 AG W/HIV-1&-2 AB AG IA: CPT | Performed by: FAMILY MEDICINE

## 2022-10-20 PROCEDURE — 84443 ASSAY THYROID STIM HORMONE: CPT | Performed by: FAMILY MEDICINE

## 2022-10-20 RX ORDER — AMLODIPINE BESYLATE 5 MG/1
5 TABLET ORAL DAILY
Qty: 90 TABLET | Refills: 3 | Status: SHIPPED | OUTPATIENT
Start: 2022-10-20 | End: 2023-04-17

## 2022-10-20 RX ORDER — PNV NO.95/FERROUS FUM/FOLIC AC 28MG-0.8MG
1 TABLET ORAL DAILY
Qty: 100 TABLET | Refills: 3 | Status: SHIPPED | OUTPATIENT
Start: 2022-10-20 | End: 2023-04-17

## 2022-10-20 NOTE — PROGRESS NOTES
Assessment/ Plan  Healthcare maintenance  Declines vaccines.    Female infertility  Indicates she is been having unprotected sex with her current boyfriend for the last year without getting pregnant.  Has had 1 child in the past.  Would welcome a pregnancy at this point-but not ready to undertake infertility work-up.  Having pelvic pain, will order pelvic ultrasound  TSH and prolactin done as was pregnancy test-though I suspect she is ovulating as she is regular.  Start prenatal vitamins    Dysmenorrhea  Normal pelvic exam today  Also having some pain with intercourse  Check ultrasound    Essential hypertension  Elevated blood pressure last 3 visits  No alcohol, smokes cigarettes and marijuana but no other drugs    Start amlodipine 5 mg.-Relatively safe in pregnancy  Follow-up 3 to 4 weeks  Encourage smoking cessation     Subjective  CC:  chief complaint  HPI:  Cramps more severe last 4-5 periods  Periods heavier than usual  gen 5-7 days duration  No contraception- doesn't want contraception  Painful sx     Blood pressure elevated as below  BP Readings from Last 6 Encounters:   10/20/22 (!) 155/99   05/10/22 (!) 148/82   09/08/21 (!) 178/100   05/24/21 112/58   04/23/21 114/60   01/25/21 127/84            Answers for HPI/ROS submitted by the patient on 10/20/2022  What is the reason for your visit today? : check up  How many servings of fruits and vegetables do you eat daily?: 0-1  On average, how many sweetened beverages do you drink each day (Examples: soda, juice, sweet tea, etc.  Do NOT count diet or artificially sweetened beverages)?: 2  How many minutes a day do you exercise enough to make your heart beat faster?: 20 to 29  How many days a week do you exercise enough to make your heart beat faster?: 3 or less  How many days per week do you miss taking your medication?: 0    PFSH:  Patient Active Problem List   Diagnosis     Ovarian cyst     Overweight (BMI 25.0-29.9)     Dyslipidemia     Snores     Adjustment  reaction with mixed disturbance of emotions and conduct     Depressive disorder     Healthcare maintenance     Dysmenorrhea     Female infertility     Essential hypertension     azithromycin (ZITHROMAX) 500 MG tablet, Take 2 tablets p.o. 1 emmanuel as needed for severe diarrhea while travelling (Patient not taking: Reported on 10/20/2022)  famotidine (PEPCID) 40 MG tablet, Take 1 tablet (40 mg) by mouth 2 times daily (Patient not taking: Reported on 10/20/2022)  nicotine (NICODERM CQ) 14 MG/24HR 24 hr patch, Place 1 patch onto the skin every 24 hours (Patient not taking: Reported on 10/20/2022)  norelgestromin-ethinyl estradiol (ORTHO EVRA) 150-35 MCG/24HR patch, Remove old patch and apply new patch onto the skin once a week for 3 weeks (21 days). Do not wear patch week 4 (days 22-28), then repeat. (Patient not taking: Reported on 10/20/2022)  phentermine 15 MG capsule, [PHENTERMINE 15 MG CAPSULE] Take 1 capsule (15 mg total) by mouth every morning. (Patient not taking: Reported on 10/20/2022)  topiramate (TOPAMAX) 25 MG tablet, [TOPIRAMATE (TOPAMAX) 25 MG TABLET] Take 1 tablet (25 mg total) by mouth 2 (two) times a day. (1 tablet/night for first 7 days.  Add AM dose if no side effects) (Patient not taking: Reported on 10/20/2022)    No current facility-administered medications on file prior to visit.       History   Smoking Status     Every Day     Packs/day: 0.50     Types: Cigarettes   Smokeless Tobacco     Never     Social History     Social History Narrative    Lives with mom       Patient Care Team:  Anabel Sharma DO as PCP - General  Marv Davis MD as Assigned PCP  ROS  As      Objective  Physical Exam  Vitals:    10/20/22 1607   BP: (!) 155/99   BP Location: Right arm   Patient Position: Sitting   Cuff Size: Adult Regular   Pulse: 82   Resp: 18   Temp: 97.8  F (36.6  C)   TempSrc: Temporal   SpO2: 100%   Weight: 86.2 kg (190 lb)     Body mass index is 32.61 kg/m .  Gen- alert, oriented/ appropriately  responsive  HEENT- normal cephalic, atraumatic.   Chest- Normal inspiration and expiration.    Clear to ascultation.    No chest wall deformity or scar.  CV- Heart regular rate and rhythm  normal tones, no murmurs   No gallops or rubs.  Ext- appear well perfused, no edema  Skin- warm and dry,   no visualized rash  External genitalia appear normal.  Normal labia minora and majora.  No lesion.  Speculum used to examine vaginal vault which has a normal appearance with physiologic discharge.  Cervix well visualized and appears normal.  Bimanual exam reveals normal sized uterus, no adnexal mass.    Diagnostics:   10      Please note: Voice recognition software was used in this dictation.  It may therefore contain typographical errors.

## 2022-10-20 NOTE — ASSESSMENT & PLAN NOTE
Elevated blood pressure last 3 visits  No alcohol, smokes cigarettes and marijuana but no other drugs    Start amlodipine 5 mg.-Relatively safe in pregnancy  Follow-up 3 to 4 weeks  Encourage smoking cessation

## 2022-10-20 NOTE — ASSESSMENT & PLAN NOTE
Indicates she is been having unprotected sex with her current boyfriend for the last year without getting pregnant.  Has had 1 child in the past.  Would welcome a pregnancy at this point-but not ready to undertake infertility work-up.  Having pelvic pain, will order pelvic ultrasound  TSH and prolactin done as was pregnancy test-though I suspect she is ovulating as she is regular.  Start prenatal vitamins

## 2022-10-21 LAB
HIV 1+2 AB+HIV1 P24 AG SERPL QL IA: NONREACTIVE
T PALLIDUM AB SER QL: NONREACTIVE

## 2022-11-22 ENCOUNTER — E-VISIT (OUTPATIENT)
Dept: URGENT CARE | Facility: CLINIC | Age: 27
End: 2022-11-22
Payer: COMMERCIAL

## 2022-11-22 ENCOUNTER — OFFICE VISIT (OUTPATIENT)
Dept: FAMILY MEDICINE | Facility: CLINIC | Age: 27
End: 2022-11-22
Payer: COMMERCIAL

## 2022-11-22 VITALS
BODY MASS INDEX: 32.17 KG/M2 | DIASTOLIC BLOOD PRESSURE: 85 MMHG | WEIGHT: 187.4 LBS | RESPIRATION RATE: 16 BRPM | OXYGEN SATURATION: 98 % | HEART RATE: 78 BPM | SYSTOLIC BLOOD PRESSURE: 134 MMHG | TEMPERATURE: 97.9 F

## 2022-11-22 DIAGNOSIS — R05.1 ACUTE COUGH: Primary | ICD-10-CM

## 2022-11-22 DIAGNOSIS — Z20.822 SUSPECTED COVID-19 VIRUS INFECTION: ICD-10-CM

## 2022-11-22 DIAGNOSIS — R07.0 THROAT PAIN: Primary | ICD-10-CM

## 2022-11-22 LAB
DEPRECATED S PYO AG THROAT QL EIA: NEGATIVE
GROUP A STREP BY PCR: NOT DETECTED

## 2022-11-22 PROCEDURE — 99213 OFFICE O/P EST LOW 20 MIN: CPT | Performed by: PHYSICIAN ASSISTANT

## 2022-11-22 PROCEDURE — 87651 STREP A DNA AMP PROBE: CPT | Performed by: PHYSICIAN ASSISTANT

## 2022-11-22 PROCEDURE — 99207 PR NO CHARGE LOS: CPT | Performed by: EMERGENCY MEDICINE

## 2022-11-22 ASSESSMENT — ENCOUNTER SYMPTOMS
RHINORRHEA: 1
COUGH: 1
CHILLS: 1
VOMITING: 0
SORE THROAT: 0
FEVER: 0

## 2022-11-22 NOTE — PROGRESS NOTES
Patient presents with:  Cough: No fever (felt warm), stuffy nose, congestion, bodyaches, chest congestion, sneezing, wheezing x couple of days, symptoms started friday  Letter for School/Work: Note for work      Clinical Decision Making: Patient experiencing sick symptoms for the past 4 days.  RST is negative.  No influenza testing done due to the patient being outside of the treatment window.  At home COVID test was negative.  Physical exam is relatively benign.  We discussed supportive cares.  Patient given a note for work.      ICD-10-CM    1. Throat pain  R07.0 Streptococcus A Rapid Screen w/Reflex to PCR     Group A Streptococcus PCR Throat Swab      2. Suspected COVID-19 virus infection  Z20.822 CANCELED: Symptomatic; Yes; 2022 COVID-19 Virus (Coronavirus) by PCR          There are no Patient Instructions on file for this visit.    HPI:  Rebecca Salas is a 26 year old female who presents today complaining of cough, nasal congestion, body aches, sneezing, wheezing for the past 4 days.  Patient's daughter is experiencing similar symptoms.  Patient had an at home COVID test that was negative and is not interested in repeat COVID testing.    History obtained from the patient.    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        Estimated Date of Delivery: 16  Elevated BP Daily THC use-to control nausea, sleep and coping with anxiety Depression-on Zoloft H/O smoking  TESTING PLANS:   BPP/NST  LAST GROWTH:   PRIMARY PHYSICIAN/PHONE:  Dr White [id number]  PERTINENT LABS: B+ 11/2/15, 3/2/16, 16        Social History     Tobacco Use     Smoking status: Every Day     Packs/day: 0.50     Types: Cigarettes     Smokeless tobacco: Never     Tobacco comments:     vapes   Substance Use Topics     Alcohol use: Not Currently     Comment: Alcoholic Drinks/day: \"not really\"       Review of Systems   Constitutional: Positive for chills. Negative for fever.   HENT: Positive for congestion and rhinorrhea. Negative for sore throat.    Respiratory: Positive for cough.    Gastrointestinal: Negative for vomiting.       Vitals:    22 1323   BP: 134/85   Pulse: 78   Resp: 16   Temp: 97.9  F (36.6  C)   TempSrc: Oral   SpO2: 98%   Weight: 85 kg (187 lb 6.4 oz)       Physical Exam  Vitals and nursing note reviewed.   Constitutional:       General: She is not in acute distress.     Appearance: She is not toxic-appearing or diaphoretic.   HENT:      Head: Normocephalic and atraumatic.      Right Ear: Tympanic membrane, ear canal and external ear normal.      Left Ear: Tympanic membrane, ear canal and external ear normal.      Mouth/Throat:      Pharynx: No oropharyngeal exudate or posterior oropharyngeal erythema.      Comments: Tonsils are surgically absent  Eyes:      Conjunctiva/sclera: Conjunctivae normal.   Cardiovascular:      Rate and Rhythm: Normal rate and regular rhythm.      Heart sounds: No murmur heard.  Pulmonary:      Effort: Pulmonary effort is normal. No respiratory distress.      Breath sounds: No stridor. No wheezing, rhonchi or rales.   Lymphadenopathy:      Cervical: Cervical adenopathy present.   Neurological:      Mental Status: She is alert.   Psychiatric:         Mood and Affect: Mood normal.         " Behavior: Behavior normal.         Thought Content: Thought content normal.         Judgment: Judgment normal.         Results:  Results for orders placed or performed in visit on 11/22/22   Streptococcus A Rapid Screen w/Reflex to PCR     Status: Normal    Specimen: Throat; Swab   Result Value Ref Range    Group A Strep antigen Negative Negative         At the end of the encounter, I discussed results, diagnosis, medications. Discussed red flags for immediate return to clinic/ER, as well as indications for follow up if no improvement. Patient understood and agreed to plan. Patient was stable for discharge.

## 2022-11-22 NOTE — LETTER
November 22, 2022      Rebecca Salas  [address]  [place] [address]        To Whom It May Concern:    Rebecca Salas  was seen on 11/22/22. Please excuse absences this week due to illness.       Sincerely,        Soo Solano PA-C

## 2022-11-22 NOTE — PATIENT INSTRUCTIONS
Rebecca,    Your symptoms show that you may have coronavirus (COVID-19). This illness can cause fever, cough and trouble breathing. Many people get a mild case and get better on their own. Some people can get very sick.    Because you reported additional symptoms, I would like to also test you for flu    What should I do?  I have placed orders for these tests.   To schedule: go to your [employer] home page and scroll down to the section that says  You have an appointment that needs to be scheduled  and click the large green button that says  Schedule Now  and follow the steps to find the next available openings.     If you are unable to complete these [employer] scheduling steps, please call [phone] to schedule your testing.     These guidelines are for isolating before returning to work, school or .   For employers, schools and day cares: This is an official notice for this person s medical guidelines for returning in-person.   For health care sites: The CDC gives different isolation and quarantine guidelines for healthcare sites, please check with these sites before arriving.     How do I self-isolate?  You isolate when you have symptoms of COVID or a test shows you have COVID, even if you don t have symptoms.   If you DO have symptoms:  Stay home and away from others  For at least 5 days after your symptoms started, AND   You are fever free for 24 hours (with no medicine that reduces fever), AND  Your other symptoms are better.  Wear a mask for 10 full days any time you are around others.  If you DON T have symptoms:  Stay at home and away from others for at least 5 days after your positive test.  Wear a mask for 10 full days any time you are around others.    How can I take care of myself?  Over the counter medications may help with your symptoms such as runny or stuffy nose, cough, chills, or fever. Talk to your care team about your options.     Some people are at high risk of severe illness (for  example, you have a weak immune system, you re 65 years or older, or you have certain medical problems). If your risk is high and your symptoms started in the last 5 to 7 days, we strongly recommend for you to get COVID treatment as soon as possible. Paxlovid, Molnupiravir and the monoclonal antibody treatments are proven safe and effective, make you feel better faster, and prevent hospitalization and death.       To schedule an appointment to discuss COVID treatment, request an appointment on [employer] (select  COVID-19 Treatment ) or call [facility] ([phone]), press 7.    Get lots of rest. Drink extra fluids (unless a doctor has told you not to)  Take Tylenol (acetaminophen) or ibuprofen for fever or pain. If you have liver or kidney problems, ask your family doctor if it's okay to take Tylenol or ibuprofen  Take over the counter medications for your symptoms, as directed by your doctor. You may also talk to your pharmacist.    If you have other health problems (like cancer, heart failure, an organ transplant or severe kidney disease): Call your specialty clinic if you don't feel better in the next 2 days.  Know when to call 911. Emergency warning signs include:  Trouble breathing or shortness of breath  Pain or pressure in the chest that doesn't go away  Feeling confused like you haven't felt before, or not being able to wake up  Bluish-colored lips or face    Where can I get more information?  [facility] - About COVID-19: [email or web address]   CDC - What to Do If You're Sick: https://www.cdc.gov/coronavirus/2019-ncov/if-you-are-sick/index.html   CDC - Quarantine & Isolation: https://www.cdc.gov/coronavirus/2019-ncov/your-health/quarantine-isolation.html   [facility] clinical trials (COVID-19 research studies): [email or web address].[email or web address]/n-clinical-trials  Below are the COVID-19 hotlines at the [place] Department of Health ([facility]). Interpreters are available.  [facility] [employer]  questions: Call [phone] or 1-732.141.4324 (7 a.m. to 7 p.m.)  For questions about schools and childcare: Call 985-037-7273 or 1-571.591.5203 (7 a.m. to 7 p.m.)  November 22, 2022  RE:  Rebecca Salas                                                                                                                  [address]  [place] [address]      To whom it may concern:    I evaluated Rebecca Salas on November 22, 2022. Rebecca Salas should be excused from work/school.     They should let their workplace manager and staffing office know when their quarantine ends.    We can not give an exact date as it depends on the information below. They can calculate this on their own or work with their manager/staffing office to calculate this. (For example if they were exposed on 10/04, they would have to quarantine for 14 full days. That would be through 10/18. They could return on 10/19.)    Quarantine Guidelines:    If patient receives a positive COVID-19 test result, they should follow the guidance of those who are giving the results. Usually the return to work is 10 (or in some cases 20 days from symptom onset.) If they work at [email or web address], they must be cleared by Employee Occupational Health and Safety to return to work.      If patient receives a negative COVID-19 test result and did not have a high risk exposure to someone with a known positive COVID-19 test, they can return to work once they're free of fever for 24 hours without fever-reducing medication and their symptoms are improving or resolved.    If patient receives a negative COVID-19 test and if they had a high risk exposure to someone who has tested positive for COVID-19 then they can return to work 14 days after their last contact with the positive individual    Note: If there is ongoing exposure to the covid positive person, this quarantine period may be longer than 14 days. (For example, if they are continually exposed to  their child, who tested positive and cannot isolate from them, then the quarantine of 7-14 days can't start until their child is no longer contagious. This is typically 10 days from onset to the child's symptoms. So the total duration may be 17-24 days in this case.)     Sincerely,  Arian Christianson MD

## 2022-12-23 ENCOUNTER — VIRTUAL VISIT (OUTPATIENT)
Dept: FAMILY MEDICINE | Facility: CLINIC | Age: 27
End: 2022-12-23
Payer: COMMERCIAL

## 2022-12-23 DIAGNOSIS — R53.83 OTHER FATIGUE: ICD-10-CM

## 2022-12-23 DIAGNOSIS — R21 RASH: Primary | ICD-10-CM

## 2022-12-23 DIAGNOSIS — Z84.0 FAMILY HISTORY OF LUPUS ERYTHEMATOSUS: ICD-10-CM

## 2022-12-23 PROCEDURE — 99212 OFFICE O/P EST SF 10 MIN: CPT | Mod: 95 | Performed by: FAMILY MEDICINE

## 2022-12-23 NOTE — PROGRESS NOTES
Rebecca is a 27 year old who is being evaluated via a billable video visit.      How would you like to obtain your AVS? MyChart  If the video visit is dropped, the invitation should be resent by: Text to cell phone: [phone]  Will anyone else be joining your video visit? No  Patient with family history of lupus in her mom, concerned that combination of fatigue and rash on her face, under her breasts, along her sides may represent lupus.  Wants to be diagnosed.  Rash is sometimes painful, sometimes itchy.  Been present for the last few weeks.    No other questions.  1. Rash      2. Other fatigue      3. Family history of lupus erythematosus      Recommend patient follow-up in person for evaluation of rash and symptoms.      Video-Visit Details    Type of service:  Video Visit   Video Start Time: 4 PM  Video End Time:411    Originating Location (pt. Location):[employer] [facility]  Distant Location (provider location):  On-site  Platform used for Video Visit: REPUCOM    Answers for HPI/ROS submitted by the patient on 12/23/2022  What is the reason for your visit today? : referral  How many servings of fruits and vegetables do you eat daily?: 2-3  On average, how many sweetened beverages do you drink each day (Examples: soda, juice, sweet tea, etc.  Do NOT count diet or artificially sweetened beverages)?: 0  How many minutes a day do you exercise enough to make your heart beat faster?: 9 or less  How many days a week do you exercise enough to make your heart beat faster?: 3 or less  How many days per week do you miss taking your medication?: 0

## 2023-01-10 ENCOUNTER — OFFICE VISIT (OUTPATIENT)
Dept: FAMILY MEDICINE | Facility: CLINIC | Age: 28
End: 2023-01-10
Payer: COMMERCIAL

## 2023-01-10 VITALS
DIASTOLIC BLOOD PRESSURE: 85 MMHG | BODY MASS INDEX: 33.47 KG/M2 | TEMPERATURE: 98.2 F | WEIGHT: 195 LBS | OXYGEN SATURATION: 99 % | RESPIRATION RATE: 18 BRPM | HEART RATE: 75 BPM | SYSTOLIC BLOOD PRESSURE: 133 MMHG

## 2023-01-10 DIAGNOSIS — L21.9 SEBORRHEIC DERMATITIS: Primary | ICD-10-CM

## 2023-01-10 DIAGNOSIS — R21 RASH: ICD-10-CM

## 2023-01-10 DIAGNOSIS — Z84.0 FAMILY HISTORY OF LUPUS ERYTHEMATOSUS: ICD-10-CM

## 2023-01-10 LAB
CRP SERPL-MCNC: <3 MG/L
ERYTHROCYTE [DISTWIDTH] IN BLOOD BY AUTOMATED COUNT: 11.9 % (ref 10–15)
HCT VFR BLD AUTO: 38.8 % (ref 35–47)
HGB BLD-MCNC: 13.1 G/DL (ref 11.7–15.7)
MCH RBC QN AUTO: 31.5 PG (ref 26.5–33)
MCHC RBC AUTO-ENTMCNC: 33.8 G/DL (ref 31.5–36.5)
MCV RBC AUTO: 93 FL (ref 78–100)
PLATELET # BLD AUTO: 273 10E3/UL (ref 150–450)
RBC # BLD AUTO: 4.16 10E6/UL (ref 3.8–5.2)
WBC # BLD AUTO: 8.1 10E3/UL (ref 4–11)

## 2023-01-10 PROCEDURE — 86039 ANTINUCLEAR ANTIBODIES (ANA): CPT | Performed by: FAMILY MEDICINE

## 2023-01-10 PROCEDURE — 86140 C-REACTIVE PROTEIN: CPT | Performed by: FAMILY MEDICINE

## 2023-01-10 PROCEDURE — 99214 OFFICE O/P EST MOD 30 MIN: CPT | Performed by: FAMILY MEDICINE

## 2023-01-10 PROCEDURE — 36415 COLL VENOUS BLD VENIPUNCTURE: CPT | Performed by: FAMILY MEDICINE

## 2023-01-10 PROCEDURE — 85027 COMPLETE CBC AUTOMATED: CPT | Performed by: FAMILY MEDICINE

## 2023-01-10 PROCEDURE — 86780 TREPONEMA PALLIDUM: CPT | Performed by: FAMILY MEDICINE

## 2023-01-10 PROCEDURE — 86038 ANTINUCLEAR ANTIBODIES: CPT | Performed by: FAMILY MEDICINE

## 2023-01-10 RX ORDER — KETOCONAZOLE 20 MG/ML
SHAMPOO TOPICAL DAILY PRN
Qty: 120 ML | Refills: 3 | Status: SHIPPED | OUTPATIENT
Start: 2023-01-10

## 2023-01-10 RX ORDER — KETOCONAZOLE 20 MG/G
CREAM TOPICAL DAILY
Qty: 30 G | Refills: 3 | Status: SHIPPED | OUTPATIENT
Start: 2023-01-10 | End: 2023-05-31

## 2023-01-10 ASSESSMENT — ANXIETY QUESTIONNAIRES
6. BECOMING EASILY ANNOYED OR IRRITABLE: MORE THAN HALF THE DAYS
7. FEELING AFRAID AS IF SOMETHING AWFUL MIGHT HAPPEN: SEVERAL DAYS
4. TROUBLE RELAXING: NEARLY EVERY DAY
GAD7 TOTAL SCORE: 15
GAD7 TOTAL SCORE: 15
5. BEING SO RESTLESS THAT IT IS HARD TO SIT STILL: MORE THAN HALF THE DAYS
1. FEELING NERVOUS, ANXIOUS, OR ON EDGE: NEARLY EVERY DAY
GAD7 TOTAL SCORE: 15
2. NOT BEING ABLE TO STOP OR CONTROL WORRYING: MORE THAN HALF THE DAYS
8. IF YOU CHECKED OFF ANY PROBLEMS, HOW DIFFICULT HAVE THESE MADE IT FOR YOU TO DO YOUR WORK, TAKE CARE OF THINGS AT HOME, OR GET ALONG WITH OTHER PEOPLE?: VERY DIFFICULT
7. FEELING AFRAID AS IF SOMETHING AWFUL MIGHT HAPPEN: SEVERAL DAYS
3. WORRYING TOO MUCH ABOUT DIFFERENT THINGS: MORE THAN HALF THE DAYS
IF YOU CHECKED OFF ANY PROBLEMS ON THIS QUESTIONNAIRE, HOW DIFFICULT HAVE THESE PROBLEMS MADE IT FOR YOU TO DO YOUR WORK, TAKE CARE OF THINGS AT HOME, OR GET ALONG WITH OTHER PEOPLE: VERY DIFFICULT

## 2023-01-10 ASSESSMENT — PATIENT HEALTH QUESTIONNAIRE - PHQ9
10. IF YOU CHECKED OFF ANY PROBLEMS, HOW DIFFICULT HAVE THESE PROBLEMS MADE IT FOR YOU TO DO YOUR WORK, TAKE CARE OF THINGS AT HOME, OR GET ALONG WITH OTHER PEOPLE: VERY DIFFICULT
SUM OF ALL RESPONSES TO PHQ QUESTIONS 1-9: 17
SUM OF ALL RESPONSES TO PHQ QUESTIONS 1-9: 17

## 2023-01-10 NOTE — PROGRESS NOTES
Assessment/ Plan  1. Seborrheic dermatitis  Scalp and labial folds.  Fairly mild now, suspected on face but not a certainty  Ketoconazole for both scalp and face.  - ketoconazole (NIZORAL) 2 % external cream; Apply topically daily  Dispense: 30 g; Refill: 3  - ketoconazole (NIZORAL) 2 % external shampoo; Apply topically daily as needed for itching or irritation  Dispense: 120 mL; Refill: 3    2. Rash  Widespread dry skin.  Also some slightly raised flattopped papules on abdomen that could be consistent with pityriasis rosea.  Check syphilis test on this basis.  Treat for dry skin as she has been doing.  Requesting test for lupus.  No other symptoms but strong family history.  Check CRP and RAKESH  - CRP, inflammation; Future  - Anti Nuclear Brigid IgG by IFA with Reflex; Future  - CBC with platelets; Future  - Treponema Abs w Reflex to RPR and Titer; Future  - CRP, inflammation  - Anti Nuclear Brigid IgG by IFA with Reflex  - CBC with platelets  - Treponema Abs w Reflex to RPR and Titer    3. Family history of lupus erythematosus  Check CRP and RAKESH, low threshold of suspicion but there is a family history     Body mass index is 33.47 kg/m .    Subjective  CC:  chief complaint  HPI:  27-year-old, here for rashes.  See my recent video visit.  Multiple areas of concern.  History of intermittent scale on scalp.  Seems to come and go, worse in the winter months    Red dots on face, mostly around the area of the mouth/labial folds.  Some scale there as well.    Dry itchy skin everywhere including face.  Has been using Lubriderm lotion which seems to help.  Scented lotions make things worse.    Mom has lupus.  Concerned that facial rash may be this.  Showed me pictures of more intense erythema on face than she has right now.  These do not appear to be consistent with lupus plaques but may be related to eczema.    Requesting letter for  animal for mental health.  She has a previous history/consistent history of mental  health concerns and this seems reasonable to me.  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 1 emmanuel as needed for severe diarrhea while travelling (Patient not taking: Reported on 10/20/2022)  famotidine (PEPCID) 40 MG tablet, Take 1 tablet (40 mg) by mouth 2 times daily (Patient not taking: Reported on 10/20/2022)  norelgestromin-ethinyl estradiol (ORTHO EVRA) 150-35 MCG/24HR patch, Remove old patch and apply new patch onto the skin once a week for 3 weeks (21 days). Do not wear patch week 4 (days 22-28), then repeat. (Patient not taking: Reported on 10/20/2022)  phentermine 15 MG capsule, [PHENTERMINE 15 MG CAPSULE] Take 1 capsule (15 mg total) by mouth every morning. (Patient not taking: Reported on 10/20/2022)  Prenatal Vit-Fe Fumarate-FA (PRENATAL VITAMIN) 27-0.8 MG TABS, Take 1 tablet by mouth daily (Patient not taking: Reported on 12/23/2022)  topiramate (TOPAMAX) 25 MG tablet, [TOPIRAMATE (TOPAMAX) 25 MG TABLET] Take 1 tablet (25 mg total) by mouth 2 (two) times a day. (1 tablet/night for first 7 days.  Add AM dose if no side effects) (Patient not taking: Reported on 10/20/2022)    No current facility-administered medications on file prior to visit.     History   Smoking Status     Every Day     Packs/day: 0.50     Types: Cigarettes   Smokeless Tobacco     Never     Social History     Social History Narrative    Lives with mom       Patient Care Team:  Anabel Sharma DO as PCP - Toñito Manuel MD as Assigned PCP  ROS  As  above      Objective  Physical Exam  Vitals:    01/10/23 1623   BP: 133/85   BP Location: Left arm   Patient Position: Sitting   Cuff Size: Adult Regular   Pulse: 75   Resp: 18   Temp: 98.2  F (36.8  C)   TempSrc: Temporal   SpO2: 99%   Weight: 88.5 kg (195 lb)     Rashes as described above.  Some scale in her scalp, some erythema around her labial folds.  Background of dry skin, a few isolated plaques on her abdomen  Diagnostics  10    Please note: Voice recognition software was used in this dictation.  It may therefore contain typographical errors.      Answers for HPI/ROS submitted by the patient on 1/10/2023  If you checked off any problems, how difficult have these problems made it for you to do your work, take care of things at home, or get along with other people?: Very difficult  PHQ9 TOTAL SCORE: 17  TREE 7 TOTAL SCORE: 15  Depression/Anxiety: Depression & Anxiety  Status since last visit:: medium  Anxiety since last: : bad  Other associated symptoms of depression:: No  Other associated symotome: : Yes  Significant life event: : relationship concerns, job concerns  Anxious:: Yes  Current substance use:: No  How many servings of fruits and vegetables do you eat daily?: 2-3  On average, how many sweetened beverages do you drink each day (Examples: soda, juice, sweet tea, etc.  Do NOT count diet or artificially sweetened beverages)?: 1  How many minutes a day do you exercise enough to make your heart beat faster?: 9 or less  How many days a week do you exercise enough to make your heart beat faster?: 3 or less  How many days per week do you miss taking your medication?: 7  What is the reason for your visit today?: rashes and emotional animal  When did your symptoms begin?: More than a month  What are your symptoms?: rashes  How would you describe these symptoms?: Mild  Are your symptoms:: Improving  Have you had these symptoms before?: Yes  Have you tried or received treatment for these symptoms before?: No  Is  there anything that makes you feel worse?: no lotion  Is there anything that makes you feel better?: good lotion

## 2023-01-11 LAB
ANA PAT SER IF-IMP: ABNORMAL
ANA SER QL IF: ABNORMAL
ANA TITR SER IF: ABNORMAL {TITER}
T PALLIDUM AB SER QL: NONREACTIVE

## 2023-02-27 ENCOUNTER — E-VISIT (OUTPATIENT)
Dept: URGENT CARE | Facility: CLINIC | Age: 28
End: 2023-02-27
Payer: COMMERCIAL

## 2023-02-27 DIAGNOSIS — U07.1 COVID: Primary | ICD-10-CM

## 2023-02-27 PROCEDURE — 99421 OL DIG E/M SVC 5-10 MIN: CPT | Mod: CS | Performed by: EMERGENCY MEDICINE

## 2023-02-27 NOTE — LETTER
[facility]  [address] [address]  Phone: [phone]    February 27, 2023        Rebecca Salas  [address]  [place] [address]          To whom it may concern:    RE: Rebecca Salas    Patient is recovering from Covid. She can return without restrictions on Wednesday, March 1st, as long as she tests negative. If testing positive she needs to be seen by PCP or in urgent care.    Please contact me for questions or concerns.      Sincerely,        Arian Christianson MD

## 2023-03-01 ENCOUNTER — TELEPHONE (OUTPATIENT)
Dept: NURSING | Facility: CLINIC | Age: 28
End: 2023-03-01
Payer: COMMERCIAL

## 2023-03-01 NOTE — TELEPHONE ENCOUNTER
Telephone call  Patient calling she had covid on 2/18 she is feeling good but she tested herself for covid again and tested positive.  Explained that she could test posit vie for covid for up to 90 days.  She was relieved that she did not need to have a appointment with a Provider.    Ewelina Whitman RN   [facility] Nurse Advisor  8:06 AM 3/1/2023

## 2023-04-17 ENCOUNTER — VIRTUAL VISIT (OUTPATIENT)
Dept: FAMILY MEDICINE | Facility: CLINIC | Age: 28
End: 2023-04-17
Payer: COMMERCIAL

## 2023-04-17 DIAGNOSIS — J06.9 VIRAL URI: Primary | ICD-10-CM

## 2023-04-17 PROCEDURE — 99213 OFFICE O/P EST LOW 20 MIN: CPT | Mod: VID | Performed by: FAMILY MEDICINE

## 2023-04-17 NOTE — LETTER
April 17, 2023      Rebecca Salas  [address] [place] [address] [address]    [facility] [address]        To Whom It May Concern:    Rebecca Salas was seen on 4/17/2023.  Please excuse her until 4/19/2023 due to illness.        Sincerely,    Brook Rushing MD

## 2023-04-17 NOTE — LETTER
April 17, 2023      Rebecca Salas  [address] [place] [address] [address]     [facility] [address]        To Whom It May Concern,     Rebecca Salas 1995 should be allowed to go to the bathroom every 2 hours of any work shift.      Sincerely,    Brook Rushing MD

## 2023-04-17 NOTE — PROGRESS NOTES
Rebecca is a 27 year old who is being evaluated via a billable video visit.      How would you like to obtain your AVS? MyChart  If the video visit is dropped, the invitation should be resent by: Text to cell phone: [phone]  Will anyone else be joining your video visit? No        Assessment & Plan     Viral URI  Symptomatic treatment, COVID testing if interested.    - Symptomatic COVID-19 Virus (Coronavirus) by PCR; Future    Work note for absence and ability to go to the bathroom every 2 hours given.         Nicotine/Tobacco Cessation:  She reports that she has been smoking cigarettes and vaping device. She has been smoking an average of .5 packs per day. She has never used smokeless tobacco.  Nicotine/Tobacco Cessation Plan:   Information offered: Patient not interested at this time          Brook Rushing MD  [facility]    Harpreet Fernandez is a 27 year old, presenting for the following health issues:  doctors note        4/17/2023    11:06 AM   Additional Questions   Roomed by Maryam     History of Present Illness       Reason for visit:  Doctors Note    She eats 0-1 servings of fruits and vegetables daily.She consumes 0 sweetened beverage(s) daily.She exercises with enough effort to increase her heart rate 9 or less minutes per day.  She exercises with enough effort to increase her heart rate 3 or less days per week.   She is taking medications regularly.     Sick yesterday and today. Has low grade temp (100.4), runny nose, nasal congestion and cough. She has had covid twice and this doesn't feel like that.    Patient going to bathroom 2 - 3 times in an 8 hour shift. Getting some concerns from HR.  Would like a letter to protect herself      Review of Systems   Constitutional, HEENT, cardiovascular, pulmonary, gi and gu systems are negative, except as otherwise noted.      Objective           Vitals:  No vitals were obtained today due to virtual  visit.    Physical Exam   GENERAL: Healthy, alert and no distress  EYES: Eyes grossly normal to inspection.  No discharge or erythema, or obvious scleral/conjunctival abnormalities.  Nose: congested voice  RESP: No audible wheeze, cough, or visible cyanosis.  No visible retractions or increased work of breathing.    SKIN: Visible skin clear. No significant rash, abnormal pigmentation or lesions.  NEURO: Cranial nerves grossly intact.  Mentation and speech appropriate for age.  PSYCH: Mentation appears normal, affect normal/bright, judgement and insight intact, normal speech and appearance well-groomed.                Video-Visit Details    Type of service:  Video Visit   Video Start Time: 11:24 AM  Video End Time:11:32 AM    Originating Location (pt. Location): Home  Distant Location (provider location):  On-site  Platform used for Video Visit: [employer]

## 2023-04-23 ENCOUNTER — HEALTH MAINTENANCE LETTER (OUTPATIENT)
Age: 28
End: 2023-04-23

## 2023-05-31 ENCOUNTER — VIRTUAL VISIT (OUTPATIENT)
Dept: FAMILY MEDICINE | Facility: CLINIC | Age: 28
End: 2023-05-31
Payer: COMMERCIAL

## 2023-05-31 DIAGNOSIS — J06.9 VIRAL UPPER RESPIRATORY TRACT INFECTION: Primary | ICD-10-CM

## 2023-05-31 PROCEDURE — 99213 OFFICE O/P EST LOW 20 MIN: CPT | Mod: VID | Performed by: NURSE PRACTITIONER

## 2023-05-31 RX ORDER — FLUTICASONE PROPIONATE 50 MCG
1 SPRAY, SUSPENSION (ML) NASAL 2 TIMES DAILY
Qty: 16 G | Refills: 0 | Status: SHIPPED | OUTPATIENT
Start: 2023-05-31 | End: 2023-06-27

## 2023-05-31 NOTE — PROGRESS NOTES
Rebecca is a 27 year old who is being evaluated via a billable video visit.      How would you like to obtain your AVS? [employer]  If the video visit is dropped, the invitation should be resent by: Text to cell phone: [phone]  Will anyone else be joining your video visit? No      Assessment & Plan     Viral upper respiratory tract infection    - fluticasone (FLONASE) 50 MCG/ACT nasal spray; Spray 1 spray into both nostrils 2 times daily for 10 days    25 minutes spent by me on the date of the encounter doing chart review, history and exam, documentation and further activities per the note       See Patient Instructions: Rest, push fluids, use Flonase nasal spray twice a day, review after visit summary tips.  Follow-up for in person evaluation if symptoms persist or worsen.  Patient in agreement.    MANJEET LOOMIS, BENNY  [facility] TERRY Fernandez is a 27 year old, presenting for the following health issues:  Sick  Symptoms started Sunday they mainly consist of nasal congestion, pressure, fatigue, body aches.  Denies fever, shortness of breath, sore throat, rash.  No chronic health issues.  Has not tested for COVID.  Discussed viral illness.  Rest and push fluids.  Letter sent through [employer].  If symptoms are worsening go in for in person evaluation.  Review after visit summary tips.  Use Flonase twice a day to help fight off illness faster.  Patient reports mental health is stable recently daughter had a birthday party and she believes there was a sick toddler there.  Patient in agreement.      5/31/2023     1:26 PM   Additional Questions   Roomed by Martha   Accompanied by n/a     History of Present Illness       Reason for visit:  Sick need doctors note  Symptom onset:  1-3 days ago  Symptoms include:  Stuffy nose, mucus, sore body and cough  Symptom intensity:  Moderate  Symptom progression:  Staying the same  Had these symptoms before:  Yes  Has tried/received  treatment for these symptoms:  No    She eats 0-1 servings of fruits and vegetables daily.She consumes 1 sweetened beverage(s) daily.She exercises with enough effort to increase her heart rate 9 or less minutes per day.  She exercises with enough effort to increase her heart rate 3 or less days per week.   She is taking medications regularly.             Review of Systems   Constitutional, HEENT, cardiovascular, pulmonary, GI, , musculoskeletal, neuro, skin, endocrine and psych systems are negative, except as otherwise noted.      Objective           Vitals:  No vitals were obtained today due to virtual visit.    Physical Exam   GENERAL: Healthy, alert and no distress  EYES: Eyes grossly normal to inspection.  No discharge or erythema, or obvious scleral/conjunctival abnormalities.  RESP: No audible wheeze, cough, or visible cyanosis.  No visible retractions or increased work of breathing.  POSITIVE for nasal congestion  SKIN: Visible skin clear. No significant rash, abnormal pigmentation or lesions.  NEURO: Cranial nerves grossly intact.  Mentation and speech appropriate for age.  PSYCH: Mentation appears normal, affect normal/bright, judgement and insight intact, normal speech and appearance well-groomed.    See orders      Video-Visit Details    Type of service:  Video Visit     Originating Location (pt. Location): Other car  Distant Location (provider location):  Off-site  Platform used for Video Visit: Estuardo

## 2023-05-31 NOTE — LETTER
May 31, 2023      Rebecca Salas  [address]    [place] [address]        To Whom It May Concern:    Rebecca Salas was seen in our clinic for respiratory illness 5/31/23.  Please excuse her work absence 5/30/23-6/2/23.        Sincerely,        BENNY SOLIS

## 2023-06-20 ENCOUNTER — VIRTUAL VISIT (OUTPATIENT)
Dept: FAMILY MEDICINE | Facility: CLINIC | Age: 28
End: 2023-06-20
Payer: COMMERCIAL

## 2023-06-20 DIAGNOSIS — F17.210 CIGARETTE NICOTINE DEPENDENCE WITHOUT COMPLICATION: ICD-10-CM

## 2023-06-20 DIAGNOSIS — K21.00 GASTROESOPHAGEAL REFLUX DISEASE WITH ESOPHAGITIS WITHOUT HEMORRHAGE: Primary | ICD-10-CM

## 2023-06-20 PROCEDURE — 99442 PR PHYSICIAN TELEPHONE EVALUATION 11-20 MIN: CPT | Mod: 95 | Performed by: PHYSICIAN ASSISTANT

## 2023-06-20 RX ORDER — NICOTINE 21 MG/24HR
1 PATCH, TRANSDERMAL 24 HOURS TRANSDERMAL EVERY 24 HOURS
Qty: 42 PATCH | Refills: 0 | Status: SHIPPED | OUTPATIENT
Start: 2023-06-20 | End: 2023-08-01

## 2023-06-20 RX ORDER — NICOTINE 21 MG/24HR
1 PATCH, TRANSDERMAL 24 HOURS TRANSDERMAL EVERY 24 HOURS
Qty: 30 PATCH | Refills: 0 | Status: SHIPPED | OUTPATIENT
Start: 2023-06-20 | End: 2023-07-20

## 2023-06-20 ASSESSMENT — ANXIETY QUESTIONNAIRES
4. TROUBLE RELAXING: MORE THAN HALF THE DAYS
2. NOT BEING ABLE TO STOP OR CONTROL WORRYING: MORE THAN HALF THE DAYS
2. NOT BEING ABLE TO STOP OR CONTROL WORRYING: MORE THAN HALF THE DAYS
5. BEING SO RESTLESS THAT IT IS HARD TO SIT STILL: SEVERAL DAYS
6. BECOMING EASILY ANNOYED OR IRRITABLE: SEVERAL DAYS
7. FEELING AFRAID AS IF SOMETHING AWFUL MIGHT HAPPEN: SEVERAL DAYS
GAD7 TOTAL SCORE: 11
7. FEELING AFRAID AS IF SOMETHING AWFUL MIGHT HAPPEN: SEVERAL DAYS
GAD7 TOTAL SCORE: 11
6. BECOMING EASILY ANNOYED OR IRRITABLE: SEVERAL DAYS
GAD7 TOTAL SCORE: 11
5. BEING SO RESTLESS THAT IT IS HARD TO SIT STILL: SEVERAL DAYS
1. FEELING NERVOUS, ANXIOUS, OR ON EDGE: MORE THAN HALF THE DAYS
3. WORRYING TOO MUCH ABOUT DIFFERENT THINGS: MORE THAN HALF THE DAYS
1. FEELING NERVOUS, ANXIOUS, OR ON EDGE: MORE THAN HALF THE DAYS
IF YOU CHECKED OFF ANY PROBLEMS ON THIS QUESTIONNAIRE, HOW DIFFICULT HAVE THESE PROBLEMS MADE IT FOR YOU TO DO YOUR WORK, TAKE CARE OF THINGS AT HOME, OR GET ALONG WITH OTHER PEOPLE: SOMEWHAT DIFFICULT
8. IF YOU CHECKED OFF ANY PROBLEMS, HOW DIFFICULT HAVE THESE MADE IT FOR YOU TO DO YOUR WORK, TAKE CARE OF THINGS AT HOME, OR GET ALONG WITH OTHER PEOPLE?: SOMEWHAT DIFFICULT
IF YOU CHECKED OFF ANY PROBLEMS ON THIS QUESTIONNAIRE, HOW DIFFICULT HAVE THESE PROBLEMS MADE IT FOR YOU TO DO YOUR WORK, TAKE CARE OF THINGS AT HOME, OR GET ALONG WITH OTHER PEOPLE: SOMEWHAT DIFFICULT
7. FEELING AFRAID AS IF SOMETHING AWFUL MIGHT HAPPEN: SEVERAL DAYS
3. WORRYING TOO MUCH ABOUT DIFFERENT THINGS: MORE THAN HALF THE DAYS
4. TROUBLE RELAXING: MORE THAN HALF THE DAYS

## 2023-06-20 NOTE — LETTER
06/20/23      To Whom It May Concern,    Rebecca Salas (1995) was seen at [facility] for a virtual visit on 06/20/23.  She reports she has been ill and unable to work for the past 2 days.  Please excuse her from work on 6/19/23 and 6/20/23.    Sincerely,      Ramandeep Preciado PA-C

## 2023-06-20 NOTE — PROGRESS NOTES
Rebecca is a 27 year old who is being evaluated via a billable telephone visit.      What phone number would you like to be contacted at? [phone]  How would you like to obtain your AVS? Mail a copy    Distant Location (provider location):  On-site    Assessment & Plan     1. Gastroesophageal reflux disease with esophagitis without hemorrhage  New concern.  She notes that she has had GERD symptoms bad enough that she has been unable to work for a few days.  Only taking Tums.  I recommended she try omeprazole, at least for a while.  She would like to do this.  Prescription sent.  I discussed with her that she has been seen for virtual visits (including today) 3 times in the past 3 months, and asked for several days off work each time.  If this continues (if she gets sick again in the next 4-8 weeks for example) she needs to be seen in person in clinic.  She understands.  - omeprazole (PRILOSEC) 20 MG DR capsule; Take 1 capsule (20 mg) by mouth daily  Dispense: 90 capsule; Refill: 3    2. Cigarette nicotine dependence without complication  Chronic, stable.  Has never really tried to quit before.  She would like to try patches.  Prescription sent as below.  I reviewed appropriate use with her.  Discussed she should not use patches and smoke at the same time.  Follow-up if questions or concerns.  - nicotine (NICODERM CQ) 14 MG/24HR 24 hr patch; Place 1 patch onto the skin every 24 hours for 30 days Use this second.  Dispense: 30 patch; Refill: 0  - nicotine (NICODERM CQ) 21 MG/24HR 24 hr patch; Place 1 patch onto the skin every 24 hours for 42 days Use this first.  Dispense: 42 patch; Refill: 0  - nicotine (NICODERM CQ) 7 MG/24HR 24 hr patch; Place 1 patch onto the skin every 24 hours for 14 days Use this last.  Dispense: 14 patch; Refill: 0      Subjective   Rebecca is a 27 year old, presenting for the following health issues:  note        6/20/2023    10:42 AM   Additional Questions   Roomed by rosalind    Accompanied by self     HPI     Missed work yesterday, Sun night on/off GERD and headache, didn't sleep well because of it.  Today still not getting sleep.  Headaches and heartburn yesterday as well.  No headaches since 6-7 pm yesterday, GERD off/on.  Slept OK last night.  Taking TUMS, no Rx for it.    Wants patches  Smoking 1 ppd.  Never tried patches before.        Review of Systems   Answers for HPI/ROS submitted by the patient on 6/20/2023  TREE 7 TOTAL SCORE: 11  Headache Symptoms are: improved  How often are you getting headaches or migraines? : 5  Are you able to do normal daily activities when you have a migraine?: No  Migraine Rescue/Relief Medications:: Tylenol  Effectiveness of rescue/relief medications:: I get some relief  Migraine Preventative Medications:: no medications to prevent migraines  ER or UC Visits:: 0 times  Depression/Anxiety: Depression & Anxiety  Status since last visit:: no change  Anxiety since last: : no change  Other associated symptoms of depression:: No  Other associated symotome: : No  Significant life event: : No  Anxious:: No  Current substance use:: No  How many servings of fruits and vegetables do you eat daily?: 2-3  On average, how many sweetened beverages do you drink each day (Examples: soda, juice, sweet tea, etc.  Do NOT count diet or artificially sweetened beverages)?: 2  How many minutes a day do you exercise enough to make your heart beat faster?: 9 or less  How many days a week do you exercise enough to make your heart beat faster?: 3 or less  How many days per week do you miss taking your medication?: 0  What is the reason for your visit today?: Headsches/heartburn  When did your symptoms begin?: 1-3 days ago  How would you describe these symptoms?: Moderate  Are your symptoms:: Improving  Have you had these symptoms before?: No          Objective           Vitals:  No vitals were obtained today due to virtual visit.    Physical Exam   healthy, alert and no  distress  PSYCH: Alert and oriented times 3; coherent speech, normal   rate and volume, able to articulate logical thoughts, able   to abstract reason, no tangential thoughts, no hallucinations   or delusions  Her affect is normal  RESP: No cough, no audible wheezing, able to talk in full sentences  Remainder of exam unable to be completed due to telephone visits            Phone call duration: 11 minutes

## 2023-06-27 DIAGNOSIS — J06.9 VIRAL UPPER RESPIRATORY TRACT INFECTION: ICD-10-CM

## 2023-06-27 RX ORDER — FLUTICASONE PROPIONATE 50 MCG
SPRAY, SUSPENSION (ML) NASAL
Qty: 16 G | Refills: 0 | Status: SHIPPED | OUTPATIENT
Start: 2023-06-27 | End: 2023-10-11

## 2023-07-11 ENCOUNTER — OFFICE VISIT (OUTPATIENT)
Dept: URGENT CARE | Facility: URGENT CARE | Age: 28
End: 2023-07-11
Payer: COMMERCIAL

## 2023-07-11 VITALS
HEART RATE: 77 BPM | RESPIRATION RATE: 14 BRPM | WEIGHT: 199.8 LBS | BODY MASS INDEX: 34.3 KG/M2 | SYSTOLIC BLOOD PRESSURE: 158 MMHG | OXYGEN SATURATION: 100 % | TEMPERATURE: 98.3 F | DIASTOLIC BLOOD PRESSURE: 91 MMHG

## 2023-07-11 DIAGNOSIS — K59.00 CONSTIPATION, UNSPECIFIED CONSTIPATION TYPE: Primary | ICD-10-CM

## 2023-07-11 PROCEDURE — 99213 OFFICE O/P EST LOW 20 MIN: CPT | Performed by: INTERNAL MEDICINE

## 2023-07-11 RX ORDER — POLYETHYLENE GLYCOL 3350 17 G/17G
1 POWDER, FOR SOLUTION ORAL DAILY
Qty: 850 G | Refills: 0 | Status: SHIPPED | OUTPATIENT
Start: 2023-07-11

## 2023-07-11 NOTE — PATIENT INSTRUCTIONS
Take miralax full dose daily for at least 3 days.  Then, if stools are too loose, decrease to 1/2 dose daily.  If stools are still too loose, then decrease to 1/2 dose every other day until stools are no longer too loose.  You may take a full dose of miralax daily if needed, and can take it for as long as needed.    You need to take at least 1/2 dose twice a week for a month to allow time for your bowel to recover and return to normal function.

## 2023-07-11 NOTE — LETTER
July 11, 2023      Rebecca Salas  [address]    [place] [address]        To Whom It May Concern:    Rebecca Salas  was seen on 7/11/23.  Please excuse her  from work 7/10-7/11/23 due to a medical issue.  She may need to miss work on 7/12/23 as well.  When her symptoms have improved, she may return to work with no restrictions.        Sincerely,        Jordana Valencia MD

## 2023-07-11 NOTE — PROGRESS NOTES
"SUBJECTIVE:  Rebecca Salas is an 27 year old female who presents for constipation.  Over the past couple days.  Didn't have BM for a couple days. Did just have BM before visit.  Has had a little bit of abd pain but also started period yesterday.  Pain better after recent BM.  Does have hx of constipation.  Pain in abdomen has come in waves ad will improve in between.  Stool today was very hard.  No meds recently for sxs.  Needs a work note.    PMH:   has a past medical history of  (normal spontaneous vaginal delivery) (2016) and Supervision of high risk pregnancy in third trimester (2016).  Patient Active Problem List   Diagnosis     Ovarian cyst     Overweight (BMI 25.0-29.9)     Dyslipidemia     Snores     Adjustment reaction with mixed disturbance of emotions and conduct     Depressive disorder     Healthcare maintenance     Dysmenorrhea     Female infertility     Essential hypertension     Cigarette nicotine dependence without complication     Gastroesophageal reflux disease with esophagitis without hemorrhage     Social History     Socioeconomic History     Marital status: Single     Spouse name: None     Number of children: None     Years of education: None     Highest education level: None   Tobacco Use     Smoking status: Every Day     Packs/day: 0.50     Types: Cigarettes, Vaping Device     Smokeless tobacco: Never     Tobacco comments:     vapes   Vaping Use     Vaping Use: Never used   Substance and Sexual Activity     Alcohol use: Not Currently     Comment: Alcoholic Drinks/day: \"not really\"     Drug use: Yes     Types: Marijuana     Comment: Drug use: smokes daily     Sexual activity: Not Currently     Partners: Male     Birth control/protection: None   Social History Narrative    Lives with mom       Family History   Problem Relation Age of Onset     Heart Disease Mother         has had surgery, unknown diagnosis     Osteoarthritis Mother         multiple surgeries on knees     " Hypertension Mother      Lupus Mother      Sleep Apnea Mother      Asthma Mother      Deep Vein Thrombosis Mother      Thyroid Disease Brother         s/p ablation     Hypertension Brother      Dementia Maternal Grandfather      Coronary Artery Disease Maternal Grandfather      Cerebrovascular Disease Maternal Grandfather      Diabetes Type 2  Maternal Grandfather      Hypertension Maternal Grandfather      Deep Vein Thrombosis Maternal Grandfather      Acute Myocardial Infarction Paternal Grandmother      Mental Illness Paternal Grandfather         suicide     No Known Problems Daughter      Colon Cancer Paternal Aunt      Breast Cancer No family hx of        ALLERGIES:  Nickel    Current Outpatient Medications   Medication     ketoconazole (NIZORAL) 2 % external shampoo     omeprazole (PRILOSEC) 20 MG DR capsule     pantoprazole (PROTONIX) 40 MG EC tablet     fluticasone (FLONASE) 50 MCG/ACT nasal spray     nicotine (NICODERM CQ) 14 MG/24HR 24 hr patch     nicotine (NICODERM CQ) 21 MG/24HR 24 hr patch     tiZANidine (ZANAFLEX) 2 MG tablet     No current facility-administered medications for this visit.         ROS:  ROS is done and is negative for general/constitutional, eye, ENT, Respiratory, cardiovascular, GI, , Skin, musculoskeletal except as noted elsewhere.  All other review of systems negative except as noted elsewhere.      OBJECTIVE:  BP (!) 158/91   Pulse 77   Temp 98.3  F (36.8  C) (Tympanic)   Resp 14   Wt 90.6 kg (199 lb 12.8 oz)   LMP 07/10/2023   SpO2 100%   BMI 34.30 kg/m    GENERAL APPEARANCE: Alert, in no acute distress  EYES: normal  NOSE:normal  OROPHARYNX:normal  NECK:No adenopathy,masses or thyromegaly  RESP: normal and clear to auscultation  CV:regular rate and rhythm and no murmurs, clicks, or gallops  ABDOMEN: Abdomen soft, non-tender. BS normal. No masses, organomegaly  SKIN: no ulcers, lesions or rash  MUSCULOSKELETAL:Musculoskeletal normal      RESULTS  No results found for any  visits on 07/11/23..  No results found for this or any previous visit (from the past 48 hour(s)).    ASSESSMENT/PLAN:    ASSESSMENT / PLAN:  (K59.00) Constipation, unspecified constipation type  (primary encounter diagnosis)  Comment: sxs c/w constipation  Plan: polyethylene glycol (MIRALAX) 17 GM/Dose powder        Reviewed medication instructions and side effects. Follow up if experiences side effects.  See patient instructions.  Advised can use long term as well if needed.I reviewed supportive care, otc meds to use if needed, expected course, and signs of concern.  Follow up as needed or if does not improve within 1 week(s) or if worsens in any way.  Reviewed red flag symptoms and is to go to the ER if experiences any of these.  Work note done.      See [facility] for orders, medications, letters, patient instructions    Jordana Valencia M.D.

## 2023-07-17 DIAGNOSIS — G44.219 EPISODIC TENSION-TYPE HEADACHE, NOT INTRACTABLE: ICD-10-CM

## 2023-07-17 RX ORDER — TIZANIDINE 2 MG/1
TABLET ORAL
Qty: 90 TABLET | Refills: 0 | Status: SHIPPED | OUTPATIENT
Start: 2023-07-17 | End: 2023-10-11

## 2023-09-23 ENCOUNTER — E-VISIT (OUTPATIENT)
Dept: URGENT CARE | Facility: CLINIC | Age: 28
End: 2023-09-23
Payer: COMMERCIAL

## 2023-09-23 DIAGNOSIS — Z20.822 SUSPECTED COVID-19 VIRUS INFECTION: Primary | ICD-10-CM

## 2023-09-23 PROCEDURE — 99207 PR NON-BILLABLE SERV PER CHARTING: CPT | Performed by: PHYSICIAN ASSISTANT

## 2023-09-23 NOTE — PATIENT INSTRUCTIONS
Deajulia Fernandez,      Based on your responses, you may have COVID-19.     Will I be tested for COVID-19?  We would like to test you for COVID. I have placed orders for this test.     To schedule: go to your [employer] home page and scroll down to the section that says  You have an appointment that needs to be scheduled  and click the large green button that says  Schedule Now  and follow the steps to find the next available openings.    If you are unable to complete these [employer] scheduling steps, please call [phone] to schedule your testing.     How do I self-isolate?  You isolate when you have symptoms of COVID or a test shows you have COVID, even if you don t have symptoms.   If you DO have symptoms:  Stay home and away from others  For at least 5 days after your symptoms started, AND   You are fever free for 24 hours (with no medicine that reduces fever), AND  Your other symptoms are better.  Wear a mask for 10 full days any time you are around others.  If you DON T have symptoms:  Stay at home and away from others for at least 5 days after your positive test.  Wear a mask for 10 full days any time you are around others.    How can I take care of myself?  Over the counter medications may help with your symptoms such as runny or stuffy nose, cough, chills, or fever.  Talk to your care team about your options.     Some people are at high risk of severe illness (for example, you have a weak immune system, you re 50 years or older, or you have certain medical problems). If your risk is high and your symptoms started in the last 5 days, we strongly recommend for you to get COVID treatment as soon as possible.There are safe and effective medicines available that can make you feel better faster, and prevent hospitalization and death.       To discuss COVID treatment you can:  Call your clinic OR [phone] ([phone]) and ask to speak to a nurse about a positive COVID test.  Send a [employer] Trigger message to your  "care team. In [employer], select \"Ask a Medical Question\" then \"Do I need an appointment\" and put \"COVID\" in the subject line. Please include a phone number to call you back in the message.             Get lots of rest. Drink extra fluids (unless a doctor has told you not to)  Take Tylenol (acetaminophen) or ibuprofen for fever or pain. If you have liver or kidney problems, ask your family doctor if it's okay to take Tylenol or ibuprofen  Take over the counter medications for your symptoms, as directed by your doctor. You may also talk to your pharmacist.    If you have other health problems (like cancer, heart failure, an organ transplant or severe kidney disease): Call your specialty clinic if you don't feel better in the next 2 days.  Know when to call 911. Emergency warning signs include:  Trouble breathing or shortness of breath  Pain or pressure in the chest that doesn't go away  Feeling confused like you haven't felt before, or not being able to wake up  Bluish-colored lips or face    Where can I get more information?  [facility] - About COVID-19: [email or web address]   CDC - What to Do If You're Sick: https://www.cdc.gov/coronavirus/2019-ncov/if-you-are-sick/index.html   CDC -  Isolation https://www.cdc.gov/coronavirus/2019-ncov/your-health/isolation.html    "

## 2023-09-25 ENCOUNTER — E-VISIT (OUTPATIENT)
Dept: FAMILY MEDICINE | Facility: CLINIC | Age: 28
End: 2023-09-25
Payer: COMMERCIAL

## 2023-09-25 DIAGNOSIS — J06.9 VIRAL UPPER RESPIRATORY TRACT INFECTION: Primary | ICD-10-CM

## 2023-09-25 DIAGNOSIS — R06.02 SOB (SHORTNESS OF BREATH): ICD-10-CM

## 2023-09-25 PROCEDURE — 99207 PR NON-BILLABLE SERV PER CHARTING: CPT | Performed by: NURSE PRACTITIONER

## 2023-09-25 RX ORDER — ALBUTEROL SULFATE 90 UG/1
2 AEROSOL, METERED RESPIRATORY (INHALATION) EVERY 6 HOURS PRN
Qty: 18 G | Refills: 1 | Status: SHIPPED | OUTPATIENT
Start: 2023-09-25 | End: 2023-10-31

## 2023-09-25 NOTE — LETTER
September 25, 2023      Rebecca Salas  [address] [place] [address] [address]     [facility] [address]        To Whom It May Concern:    Rebecca Salas  was seen on 09/25/2023.  Please excuse her  until 09/26/2023 due to illness.        Sincerely,        BENNY SOLIS

## 2023-10-06 ENCOUNTER — E-VISIT (OUTPATIENT)
Dept: FAMILY MEDICINE | Facility: CLINIC | Age: 28
End: 2023-10-06
Payer: COMMERCIAL

## 2023-10-06 DIAGNOSIS — R10.9 STOMACH PAIN: ICD-10-CM

## 2023-10-06 DIAGNOSIS — K59.00 CONSTIPATION, UNSPECIFIED CONSTIPATION TYPE: Primary | ICD-10-CM

## 2023-10-06 PROCEDURE — 99207 PR NON-BILLABLE SERV PER CHARTING: CPT | Performed by: NURSE PRACTITIONER

## 2023-10-06 NOTE — LETTER
October 10, 2023      Rebecca Salas  [address] [place] [address] [address]     [facility] [address]        To Whom It May Concern:    Rebecca Salas was seen for evaluation, please excuse her work absence Friday 10/06/2023 due to illness.        Sincerely,        BENNY SOLIS

## 2023-10-07 ENCOUNTER — E-VISIT (OUTPATIENT)
Dept: FAMILY MEDICINE | Facility: CLINIC | Age: 28
End: 2023-10-07
Payer: COMMERCIAL

## 2023-10-07 DIAGNOSIS — K59.00 CONSTIPATION, UNSPECIFIED CONSTIPATION TYPE: Primary | ICD-10-CM

## 2023-10-07 PROCEDURE — 99207 PR NO CHARGE LOS: CPT | Performed by: FAMILY MEDICINE

## 2023-10-30 ENCOUNTER — TELEPHONE (OUTPATIENT)
Dept: FAMILY MEDICINE | Facility: CLINIC | Age: 28
End: 2023-10-30

## 2023-10-30 NOTE — TELEPHONE ENCOUNTER
Patient called the clinic because she missed her Video visit this afternoon. She is scheduled for a Telephone visit.     Future Appointments 10/30/2023 - 4/27/2024        Date Visit Type Length Department Provider     10/31/2023 11:00 AM ACUTE VISIT 30 min [facility] Karie Jay NP    Location Instructions:     [facility] is located at [address] [place] [address], one block east of [place] [address] and just north of the [address]. Access the parking lot by turning east from [address]  onto [address] [place] [address], then turning north on [address].                   Eryn Pa RN BSN  [facility]

## 2023-10-30 NOTE — TELEPHONE ENCOUNTER
Pt calling to ask for virtual appointment instead of in person appointment and to be changed to acute visit for Congestion and sore throat, symptoms started 10/28/23. Cough that leads to scratchy throat.    RN huddled with provider and MA to discuss options, pcp and MA in agreement for pt to be seen via virtual visit for acute concerns.     Pt informed to login into [employer] and complete pre-visit questions asap as visit with provider was within 10mins. Pt verbalized good understanding and stated no further questions.     Risa Ritchie RN on 10/30/2023

## 2023-10-31 ENCOUNTER — VIRTUAL VISIT (OUTPATIENT)
Dept: FAMILY MEDICINE | Facility: CLINIC | Age: 28
End: 2023-10-31

## 2023-10-31 DIAGNOSIS — J06.9 VIRAL URI WITH COUGH: Primary | ICD-10-CM

## 2023-10-31 DIAGNOSIS — J45.909 REACTIVE AIRWAY DISEASE IN PEDIATRIC PATIENT: ICD-10-CM

## 2023-10-31 DIAGNOSIS — F17.200 TOBACCO USE DISORDER: ICD-10-CM

## 2023-10-31 DIAGNOSIS — R06.02 SOB (SHORTNESS OF BREATH): ICD-10-CM

## 2023-10-31 PROCEDURE — 99214 OFFICE O/P EST MOD 30 MIN: CPT | Mod: 95 | Performed by: NURSE PRACTITIONER

## 2023-10-31 RX ORDER — PREDNISONE 20 MG/1
40 TABLET ORAL DAILY
Qty: 10 TABLET | Refills: 0 | Status: SHIPPED | OUTPATIENT
Start: 2023-10-31 | End: 2023-11-05

## 2023-10-31 RX ORDER — ALBUTEROL SULFATE 90 UG/1
2 AEROSOL, METERED RESPIRATORY (INHALATION) EVERY 6 HOURS PRN
Qty: 18 G | Refills: 1 | Status: SHIPPED | OUTPATIENT
Start: 2023-10-31 | End: 2023-12-14

## 2023-10-31 RX ORDER — BENZONATATE 200 MG/1
200 CAPSULE ORAL 3 TIMES DAILY PRN
Qty: 42 CAPSULE | Refills: 0 | Status: SHIPPED | OUTPATIENT
Start: 2023-10-31 | End: 2024-03-21

## 2023-10-31 NOTE — LETTER
October 31, 2023      Rebecca Salas  [address] [place] [address] [facility] [address]     [facility] [address]        To Whom It May Concern:    Rebecca Salas  was seen on 10/31/2023.  Please excuse her until 11/2/2023 due to illness.        Sincerely,        BENNY SOLIS

## 2023-10-31 NOTE — PROGRESS NOTES
Rebecca is a 27 year old who is being evaluated via a billable telephone visit.      What phone number would you like to be contacted at? [phone]  How would you like to obtain your AVS? David    Distant Location (provider location):  On-site    Assessment & Plan     Viral URI with cough    - REVIEW OF HEALTH MAINTENANCE PROTOCOL ORDERS  - predniSONE (DELTASONE) 20 MG tablet; Take 2 tablets (40 mg) by mouth daily for 5 days Take in the morning with food  - Symptomatic COVID-19 Virus (Coronavirus) by PCR Nose; Future  - Influenza A/B antigen; Future  - RSV rapid antigen; Future  - albuterol (PROAIR HFA/PROVENTIL HFA/VENTOLIN HFA) 108 (90 Base) MCG/ACT inhaler; Inhale 2 puffs into the lungs every 6 hours as needed for shortness of breath, wheezing or cough    Reactive airway disease in pediatric patient    - predniSONE (DELTASONE) 20 MG tablet; Take 2 tablets (40 mg) by mouth daily for 5 days Take in the morning with food  - Symptomatic COVID-19 Virus (Coronavirus) by PCR Nose; Future  - Influenza A/B antigen; Future  - RSV rapid antigen; Future  - benzonatate (TESSALON) 200 MG capsule; Take 1 capsule (200 mg) by mouth 3 times daily as needed for cough (Keep out of reach of children)  - albuterol (PROAIR HFA/PROVENTIL HFA/VENTOLIN HFA) 108 (90 Base) MCG/ACT inhaler; Inhale 2 puffs into the lungs every 6 hours as needed for shortness of breath, wheezing or cough    SOB (shortness of breath)    - benzonatate (TESSALON) 200 MG capsule; Take 1 capsule (200 mg) by mouth 3 times daily as needed for cough (Keep out of reach of children)  - albuterol (PROAIR HFA/PROVENTIL HFA/VENTOLIN HFA) 108 (90 Base) MCG/ACT inhaler; Inhale 2 puffs into the lungs every 6 hours as needed for shortness of breath, wheezing or cough    Tobacco use disorder      Review of external notes as documented elsewhere in note  Ordering of each unique test  Prescription drug management  30 minutes spent by me on the date of the encounter doing  chart review, history and exam, documentation and further activities per the note       See Patient Instructions    MANJEET LOOMIS NP  [facility] TERRY Fernandez is a 27 year old, presenting for the following health issues:  Sick    She reports she woke up Saturday and her nose felt weird.  Sunday night at work she started feeling pretty sick with cough, congestion, fatigue, headache.  She reports history of reactive airway as a child and uses an inhaler.  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  Her chest hurts from coughing.  She has not done a home COVID test.  Discussed viral illness versus a pneumonia.  She is in agreement to making a lab appointment for COVID, flu, RSV testing.  Discussed treating her with prednisone, inhaler and cough suppressant for now awaiting test results.  She denies history of allergies.  She is a smoker.  Tips given on after visit summary advised her to review tips.  She does need a work note for tonight and tomorrow.  Discussed if symptoms are worsening she needs to go in for in person evaluation.  She is in agreement.    History of Present Illness       Reason for visit:  Illness  Symptom onset:  3-7 days ago  Symptoms include:  Cough congestion  Symptom intensity:  Moderate  Symptom progression:  Worsening  Had these symptoms before:  No  What makes it worse:  N/a  What makes it better:  N/a    She eats 2-3 servings of fruits and vegetables daily.She consumes 1 sweetened beverage(s) daily.She exercises with enough effort to increase her heart rate 9 or less minutes per day.  She exercises with enough effort to increase her heart rate 3 or less days per week. She is missing 7 dose(s) of medications per week.  She is not taking prescribed medications regularly due to remembering to take.       Acute Illness  Acute illness concerns: cough and congestion   Onset/Duration: x4 days   Symptoms:  Fever:  No  Chills/Sweats: No  Headache (location?): YES  Sinus Pressure: YES  Conjunctivitis:  No  Ear Pain: no  Rhinorrhea: YES  Congestion: YES  Sore Throat: YES- due to coughing   Cough: YES-productive of yellow sputum, with shortness of breath  Wheeze: No  Decreased Appetite: YES  Nausea: YES  Vomiting: No  Diarrhea: No  Dysuria/Freq.: No  Dysuria or Hematuria: No  Fatigue/Achiness: YES  Sick/Strep Exposure: YES  Therapies tried and outcome: Tylenol - does help, Nyquil         Review of Systems   Constitutional, HEENT, cardiovascular, pulmonary, GI, , musculoskeletal, neuro, skin, endocrine and psych systems are negative, except as otherwise noted.      Objective           Vitals:  No vitals were obtained today due to virtual visit.    Physical Exam   healthy, alert, and no distress  PSYCH: Alert and oriented times 3; coherent speech, normal   rate and volume, able to articulate logical thoughts, able   to abstract reason, no tangential thoughts, no hallucinations   or delusions  Her affect is normal  RESP: No cough, no audible wheezing, able to talk in full sentences  Remainder of exam unable to be completed due to telephone visits    See orders-advised to call or make a lab only appointment via [employer]            Phone call duration: 9 minutes

## 2023-11-01 ENCOUNTER — LAB (OUTPATIENT)
Dept: LAB | Facility: CLINIC | Age: 28
End: 2023-11-01
Attending: PHYSICIAN ASSISTANT

## 2023-11-01 DIAGNOSIS — J45.909 REACTIVE AIRWAY DISEASE IN PEDIATRIC PATIENT: ICD-10-CM

## 2023-11-01 DIAGNOSIS — Z20.822 SUSPECTED COVID-19 VIRUS INFECTION: ICD-10-CM

## 2023-11-01 DIAGNOSIS — J06.9 VIRAL URI WITH COUGH: ICD-10-CM

## 2023-11-01 LAB
FLUAV RNA SPEC QL NAA+PROBE: NEGATIVE
FLUBV RNA RESP QL NAA+PROBE: NEGATIVE
RSV RNA SPEC NAA+PROBE: NEGATIVE
SARS-COV-2 RNA RESP QL NAA+PROBE: NEGATIVE

## 2023-11-01 PROCEDURE — 87637 SARSCOV2&INF A&B&RSV AMP PRB: CPT

## 2023-11-01 PROCEDURE — 87635 SARS-COV-2 COVID-19 AMP PRB: CPT

## 2023-11-02 LAB — SARS-COV-2 RNA RESP QL NAA+PROBE: NEGATIVE

## 2023-11-02 NOTE — RESULT ENCOUNTER NOTE
Noel Fernandez,    Thank you for your recent office visit.    Here are your recent results.  Negative for COVID    Feel free to contact me via [employer] or call the clinic at [phone].    Sincerely,    REBEL Cameron, FNP-BC

## 2023-12-14 DIAGNOSIS — J06.9 VIRAL URI WITH COUGH: ICD-10-CM

## 2023-12-14 DIAGNOSIS — J45.909 REACTIVE AIRWAY DISEASE IN PEDIATRIC PATIENT: ICD-10-CM

## 2023-12-14 DIAGNOSIS — R06.02 SOB (SHORTNESS OF BREATH): ICD-10-CM

## 2023-12-14 RX ORDER — ALBUTEROL SULFATE 90 UG/1
AEROSOL, METERED RESPIRATORY (INHALATION)
Qty: 18 G | Refills: 1 | Status: SHIPPED | OUTPATIENT
Start: 2023-12-14

## 2023-12-27 ENCOUNTER — TELEPHONE (OUTPATIENT)
Dept: FAMILY MEDICINE | Facility: CLINIC | Age: 28
End: 2023-12-27

## 2023-12-27 ENCOUNTER — OFFICE VISIT (OUTPATIENT)
Dept: FAMILY MEDICINE | Facility: CLINIC | Age: 28
End: 2023-12-27

## 2023-12-27 ENCOUNTER — HOSPITAL ENCOUNTER (OUTPATIENT)
Dept: GENERAL RADIOLOGY | Facility: HOSPITAL | Age: 28
Discharge: HOME | End: 2023-12-27
Attending: STUDENT IN AN ORGANIZED HEALTH CARE EDUCATION/TRAINING PROGRAM | Admitting: STUDENT IN AN ORGANIZED HEALTH CARE EDUCATION/TRAINING PROGRAM

## 2023-12-27 VITALS
TEMPERATURE: 97.5 F | BODY MASS INDEX: 35.36 KG/M2 | HEART RATE: 81 BPM | RESPIRATION RATE: 18 BRPM | SYSTOLIC BLOOD PRESSURE: 135 MMHG | WEIGHT: 206 LBS | DIASTOLIC BLOOD PRESSURE: 79 MMHG | OXYGEN SATURATION: 97 %

## 2023-12-27 DIAGNOSIS — M54.50 ACUTE LEFT-SIDED LOW BACK PAIN WITHOUT SCIATICA: Primary | ICD-10-CM

## 2023-12-27 DIAGNOSIS — M54.50 ACUTE LEFT-SIDED LOW BACK PAIN WITHOUT SCIATICA: ICD-10-CM

## 2023-12-27 PROCEDURE — 96372 THER/PROPH/DIAG INJ SC/IM: CPT | Performed by: STUDENT IN AN ORGANIZED HEALTH CARE EDUCATION/TRAINING PROGRAM

## 2023-12-27 PROCEDURE — 99214 OFFICE O/P EST MOD 30 MIN: CPT | Mod: 25 | Performed by: STUDENT IN AN ORGANIZED HEALTH CARE EDUCATION/TRAINING PROGRAM

## 2023-12-27 PROCEDURE — 72100 X-RAY EXAM L-S SPINE 2/3 VWS: CPT

## 2023-12-27 RX ORDER — CYCLOBENZAPRINE HCL 10 MG
10 TABLET ORAL 3 TIMES DAILY PRN
Qty: 30 TABLET | Refills: 0 | Status: SHIPPED | OUTPATIENT
Start: 2023-12-27 | End: 2024-03-21

## 2023-12-27 RX ORDER — KETOROLAC TROMETHAMINE 30 MG/ML
30 INJECTION, SOLUTION INTRAMUSCULAR; INTRAVENOUS ONCE
Status: COMPLETED | OUTPATIENT
Start: 2023-12-27 | End: 2023-12-27

## 2023-12-27 RX ADMIN — KETOROLAC TROMETHAMINE 30 MG: 30 INJECTION, SOLUTION INTRAMUSCULAR; INTRAVENOUS at 16:07

## 2023-12-27 NOTE — TELEPHONE ENCOUNTER
Patient Quality Outreach    Patient is due for the following:   Asthma  -  ACT needed and AAP  Hypertension -  BP check  Cervical Cancer Screening - PAP Needed  Physical Preventive Adult Physical      Topic Date Due    COVID-19 Vaccine (1) Never done    Pneumococcal Vaccine (1 of 2 - PCV) Never done    Flu Vaccine (1) Never done         Type of outreach:    Chart review performed, no outreach needed.    Appointment on 01/05/2024, appointment notes updated.    Questions for provider review:    None           Angela Coles  Chart routed to Care Team.

## 2023-12-27 NOTE — LETTER
March 8, 2024    To  Rebecca Salas  [address] [place] [address]     [facility] [address]    Your team at [facility] cares about your health. We have reviewed your chart and based on our findings; we are making the following recommendations to better manage your health.     You are in particular need of attention regarding the following:     Asthma Control Test     This screening tool helps us to assess how well your asthma is controlled.Good asthma control leads to fewer asthma symptoms and greater health. If your asthma is not in good control (score is 19 or less) or you have been to the ER or urgent care for your asthma, it is recommended you be seen by your provider for medication and lifestyle adjustments.      Please complete and return the attached Asthma Control Test respond below with you answers for each question: Adult ACT     This is valuable information that is requested by your Care Team.    Schedule a primary care office visit with your provider for a Pap Smear to screen for Cervical Cancer.  PREVENTATIVE VISIT: Physical    If you have already completed these items, please contact the clinic via phone or   MyChart so your care team can review and update your records. Thank you for   choosing [facility] for your healthcare needs. For any questions,   concerns, or to schedule an appointment please contact our clinic.    Healthy Regards,      Your [facility] Care Team

## 2023-12-27 NOTE — PROGRESS NOTES
ASSESSMENT & PLAN:   Diagnoses and all orders for this visit:  Acute left-sided low back pain without sciatica  -     XR Lumbar Spine 2/3 Views; Future  -     ketorolac (TORADOL) injection 30 mg  -     cyclobenzaprine (FLEXERIL) 10 MG tablet; Take 1 tablet (10 mg) by mouth 3 times daily as needed for muscle spasms  -     Spine  Referral; Future    Sudden onset left low back pain x 2 days after lifting a [place] gift. No red flag symptoms or findings. X-ray shows grade 1 retrolisthesis of L4 on L5 and L5 on S1 as well as disc height loss at L5-S1. Toradol given in clinic with minimal improvement. Will treat with Tylenol/ibuprofen, heat/ice, Flexeril as needed. Referral to orthospine if symptoms persist    At the end of the encounter, I discussed results, diagnosis, medications. Discussed red flags for immediate return to clinic/ER, as well as indications for follow up if no improvement. Patient and/or caregiver understood and agreed to plan. Patient was stable for discharge.    Patient Instructions   Your back pain is likely due to a muscle strain/spasm.  Take flexeril as directed. Caution with this medication as it can make you drowsy.  Take acetaminophen and/or ibuprofen as needed for pain  Acetaminophen (tylenol)  325-650 mg every 4-6 hours as needed OR 1000 mg every 8 hours as needed. Maximum dose: 3000 mg/day  Ibuprofen (advil, motrin)  200-400 mg every 4-6 hours as needed  mg every 6-8 hours. Maximum dose: 2400 mg/day     Recommend heat/ice to the area and light stretching as pain allows.  If you develop any incontinence, numbness in the groin area, or leg weakness, go to the ER.   Back pain can be a chronic issue. You should follow-up with your PCP if you still have pain in 1-2 weeks.     ------------------------------------------------------------------------  SUBJECTIVE  History was obtained from patient.    Patient presents with:  Back Pain: Lower back constant stabbing and sharp; more  discomfort pain x noa night. No diarrhea or constipation. Pain radiates to Lt hip spasms.    HPI  Rebecca Salas is a(n) 28 year old female presenting to urgent care for left lower back pain x 2 days. She was bending over lifting up a gift. The case bake broke and she had a sudden popping sensation and pain in the left lower back. Pain radiates into the left buttocks and hip. No leg weakness, numbness, tingling. No saddle anesthesia, incontinence, difficulty, using restroom.    Review of Systems    Current Outpatient Medications   Medication Sig Dispense Refill    cyclobenzaprine (FLEXERIL) 10 MG tablet Take 1 tablet (10 mg) by mouth 3 times daily as needed for muscle spasms 30 tablet 0    albuterol (PROAIR HFA/PROVENTIL HFA/VENTOLIN HFA) 108 (90 Base) MCG/ACT inhaler INHALE 2 PUFFS INTO THE LUNGS EVERY 6 HOURS AS NEEDED FOR SHORTNESS OF BREATH OR WHEEZING OR COUGH (Patient not taking: Reported on 2023) 18 g 1    benzonatate (TESSALON) 200 MG capsule Take 1 capsule (200 mg) by mouth 3 times daily as needed for cough (Keep out of reach of children) (Patient not taking: Reported on 2023) 42 capsule 0    ketoconazole (NIZORAL) 2 % external shampoo Apply topically daily as needed for itching or irritation (Patient not taking: Reported on 2023) 120 mL 3    polyethylene glycol (MIRALAX) 17 GM/Dose powder Take 17 g (1 Capful) by mouth daily (Patient not taking: Reported on 2023) 850 g 0     Problem List:  2023-10: Reactive airway disease in pediatric patient  2023-10: Tobacco use disorder  2023: Cigarette nicotine dependence without complication  2023: Gastroesophageal reflux disease with esophagitis without   hemorrhage  2022-10: Healthcare maintenance  2022-10: Dysmenorrhea  2022-10: Female infertility  2022-10: Essential hypertension  2021: Overweight (BMI 25.0-29.9)  2021: Dyslipidemia  2021: Snores  2018-10: Ovarian cyst  2016:  (normal spontaneous vaginal  delivery)  2016-05: Supervision of high risk pregnancy in third trimester  2009-07: Depressive disorder  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   PSYCH: mentation appears normal. Normal affect  HEAD: normocephalic, atraumatic.  MSK: -no tenderness of thoracic or lumbar spinous process, iliac crest bilaterally, SI joints. Tender to palpation of left paraspinal and left lumbar muscles. 5/5 knee flexion and extension bilaterally. 5/5 dorsiflexion and plantarflexion bilaterally. Sensation of lower extremities intact and symmetric.    Xrays were preliminarily reviewed by me - negative.     Results for orders placed or performed during the hospital encounter of 12/27/23   XR Lumbar Spine 2/3 Views     Status: None    Narrative    EXAM: XR LUMBAR SPINE 2/3 VIEWS  LOCATION: [facility]  DATE: 12/27/2023    INDICATION: left LB pain  COMPARISON: None.      Impression    IMPRESSION: No fracture. Normal vertebral heights. There is lumbar levoscoliosis centered at L3. Rust angle 14 degrees between the superior endplates of T12 and L5. There is grade 1 retrolisthesis of L4 on L5 and L5 on S1. There is moderate disc height   loss which is greatest at L5-S1. There is mild to moderate facet hypertrophy at L5-S1. Normal extraspinal structures.

## 2023-12-27 NOTE — PATIENT INSTRUCTIONS
Your back pain is likely due to a muscle strain/spasm.  Take flexeril as directed. Caution with this medication as it can make you drowsy.  Take acetaminophen and/or ibuprofen as needed for pain  Acetaminophen (tylenol)  325-650 mg every 4-6 hours as needed OR 1000 mg every 8 hours as needed. Maximum dose: 3000 mg/day  Ibuprofen (advil, motrin)  200-400 mg every 4-6 hours as needed  mg every 6-8 hours. Maximum dose: 2400 mg/day     Recommend heat/ice to the area and light stretching as pain allows.  If you develop any incontinence, numbness in the groin area, or leg weakness, go to the ER.   Back pain can be a chronic issue. You should follow-up with your PCP if you still have pain in 1-2 weeks.

## 2023-12-27 NOTE — LETTER
December 27, 2023      Rebecca Salas  [address] [place] [address] [facility] [address]    [facility] [address]        To Whom It May Concern:    Rebecca Salas  was seen on 12/27/23.  Please excuse her 12/26-12/28 due to injury.        Sincerely,        Chiquis Leong PA-C

## 2023-12-27 NOTE — LETTER
February 7, 2024    To  Rebecca Salas  [address] [place] [address]     [facility] [address]    Your team at [facility] cares about your health. We have reviewed your chart and based on our findings; we are making the following recommendations to better manage your health.     You are in particular need of attention regarding the following:     Asthma Control Test     This screening tool helps us to assess how well your asthma is controlled.Good asthma control leads to fewer asthma symptoms and greater health. If your asthma is not in good control (score is 19 or less) or you have been to the ER or urgent care for your asthma, it is recommended you be seen by your provider for medication and lifestyle adjustments.      Please complete and return the attached Asthma Control Test respond below with you answers for each question: Adult ACT     This is valuable information that is requested by your Care Team.    Schedule a primary care office visit with your provider for a Pap Smear to screen for Cervical Cancer.  Please schedule a Nurse Only Appointment with your primary care clinic to update your immunizations that are due.  PREVENTATIVE VISIT: Physical    If you have already completed these items, please contact the clinic via phone or   [employer] so your care team can review and update your records. Thank you for   choosing [facility] for your healthcare needs. For any questions,   concerns, or to schedule an appointment please contact our clinic.    Healthy Regards,      Your [facility] Care Team

## 2024-01-08 NOTE — TELEPHONE ENCOUNTER
Patient Quality Outreach    Patient is due for the following:   Asthma  -  ACT needed and AAP  Hypertension -  BP check  Cervical Cancer Screening - PAP Needed  Physical Preventive Adult Physical      Topic Date Due    COVID-19 Vaccine (1) Never done    Pneumococcal Vaccine (1 of 2 - PCV) Never done    Flu Vaccine (1) Never done     Patient no showed for appointment on 01/05/2024, [employer] message sent.    Type of outreach:    Sent [employer] message.      Questions for provider review:    None           Angela Ayoub routed to Care Team.

## 2024-02-07 NOTE — TELEPHONE ENCOUNTER
Patient Quality Outreach    Patient is due for the following:   Asthma  -  ACT needed and AAP  Cervical Cancer Screening - PAP Needed  Physical Preventive Adult Physical      Topic Date Due    COVID-19 Vaccine (1) Never done    Pneumococcal Vaccine (1 of 2 - PCV) Never done    Flu Vaccine (1) Never done       Type of outreach:    Sent letter.      Questions for provider review:    None           Angela Coles  Chart routed to Care Team.

## 2024-03-08 NOTE — TELEPHONE ENCOUNTER
Patient Quality Outreach    Patient is due for the following:   Asthma  -  ACT needed and AAP  Cervical Cancer Screening - PAP Needed  Physical Preventive Adult Physical      Topic Date Due    Pneumococcal Vaccine (1 of 2 - PCV) Never done    Flu Vaccine (1) Never done    COVID-19 Vaccine (1 - 2023-24 season) Never done       Type of outreach:    Sent letter.      Questions for provider review:    None           Angela Coles  Chart routed to Care Team.

## 2024-03-20 ENCOUNTER — LAB (OUTPATIENT)
Dept: LAB | Facility: CLINIC | Age: 29
End: 2024-03-20

## 2024-03-20 ENCOUNTER — VIRTUAL VISIT (OUTPATIENT)
Dept: FAMILY MEDICINE | Facility: CLINIC | Age: 29
End: 2024-03-20

## 2024-03-20 ENCOUNTER — TELEPHONE (OUTPATIENT)
Dept: FAMILY MEDICINE | Facility: CLINIC | Age: 29
End: 2024-03-20

## 2024-03-20 DIAGNOSIS — Z13.220 SCREENING FOR HYPERLIPIDEMIA: Primary | ICD-10-CM

## 2024-03-20 DIAGNOSIS — Z71.1 CONCERN ABOUT STD IN FEMALE WITHOUT DIAGNOSIS: ICD-10-CM

## 2024-03-20 DIAGNOSIS — R10.30 LOWER ABDOMINAL PAIN: Primary | ICD-10-CM

## 2024-03-20 DIAGNOSIS — R10.30 LOWER ABDOMINAL PAIN: ICD-10-CM

## 2024-03-20 DIAGNOSIS — N89.8 VAGINAL DISCHARGE: ICD-10-CM

## 2024-03-20 LAB
HCG UR QL: NEGATIVE
T PALLIDUM AB SER QL: NONREACTIVE

## 2024-03-20 PROCEDURE — 86696 HERPES SIMPLEX TYPE 2 TEST: CPT

## 2024-03-20 PROCEDURE — 87389 HIV-1 AG W/HIV-1&-2 AB AG IA: CPT

## 2024-03-20 PROCEDURE — 80061 LIPID PANEL: CPT

## 2024-03-20 PROCEDURE — 86780 TREPONEMA PALLIDUM: CPT

## 2024-03-20 PROCEDURE — 87491 CHLMYD TRACH DNA AMP PROBE: CPT

## 2024-03-20 PROCEDURE — 36415 COLL VENOUS BLD VENIPUNCTURE: CPT

## 2024-03-20 PROCEDURE — 81025 URINE PREGNANCY TEST: CPT

## 2024-03-20 PROCEDURE — 87591 N.GONORRHOEAE DNA AMP PROB: CPT

## 2024-03-20 PROCEDURE — 86695 HERPES SIMPLEX TYPE 1 TEST: CPT

## 2024-03-20 PROCEDURE — 99442 PR PHYSICIAN TELEPHONE EVALUATION 11-20 MIN: CPT | Mod: 93 | Performed by: PHYSICIAN ASSISTANT

## 2024-03-20 NOTE — TELEPHONE ENCOUNTER
Patient calling requesting lab orders for possible yeast infection and STD testing. Nurse advised that an appointment would be needed and patient stated they could not find an appointment today. Patient requested a virtual visit stating she is not able to make it to the clinic but could have labs done later. Patient scheduled.     Ashwini Olsen RN

## 2024-03-20 NOTE — PROGRESS NOTES
Rebecca is a 28 year old who is being evaluated via a billable telephone visit.    What phone number would you like to be contacted at? [phone]  How would you like to obtain your AVS? David  Originating Location (pt. Location): Home    Distant Location (provider location):  On-site    Assessment & Plan   Problem List Items Addressed This Visit    None  Visit Diagnoses       Lower abdominal pain    -  Primary    Relevant Orders    UA Macroscopic with reflex to Microscopic and Culture - Lab Collect    HCG qualitative urine (Completed)    Concern about STD in female without diagnosis        Relevant Orders    Treponema Abs w Reflex to RPR and Titer (Completed)    Herpes Simplex Virus 1 and 2 IgG (Completed)    HIV Antigen Antibody Combo (Completed)    NEISSERIA GONORRHOEA PCR (Completed)    CHLAMYDIA TRACHOMATIS PCR (Completed)    Vaginal discharge        Relevant Medications    fluconazole (DIFLUCAN) 150 MG tablet    Other Relevant Orders    Multiplex Vaginal Panel by PCR (Completed)           Pleasant 28-year-old female presenting for evaluation of vaginal discharge and itching similar to previous BV.  She would also like screening for sexually transmitted infections.  Labs returned reassuring aside from a multiplex vaginal swab which showed yeast and BV.  She was treated for both with fluconazole and metronidazole.  Also positive for HSV 1, as expected.  Low suspicion for PID, TOA, or other worrisome process at this point, though I encouraged her to have an in person visit in clinic, urgent care or the emergency department if her symptoms worsen/change in any time.    Ordering of each unique test  Prescription drug management  13 minutes spent by me on the date of the encounter doing chart review, history and exam, documentation and further activities per the note     See Patient Instructions      Subjective   Rebecca is a 28 year old, presenting for the following health issues:  Vaginal Problem       3/20/2024     8:30 AM   Additional Questions   Roomed by Carla Coles CMA   Accompanied by N/A         3/20/2024     8:30 AM   Patient Reported Additional Medications   Patient reports taking the following new medications No new medications     Vaginal Problem      Patient would like to test for a yeast infection.  Her symptoms include, itchiness, white discharge, and odor. Began ~1 week ago. No concerns for STIs. Sexually active with one male partner. No condom use. LMP 3/1/24. No abd pain, n/v, urinary symptoms, fever, or other symptoms.      Review of Systems  Constitutional, HEENT, cardiovascular, pulmonary, gi and gu systems are negative, except as otherwise noted.      Objective           Vitals:  No vitals were obtained today due to virtual visit.    Physical Exam   General: Alert and no distress //Respiratory: No audible wheeze, cough, or shortness of breath // Psychiatric:  Appropriate affect, tone, and pace of words      Results for orders placed or performed in visit on 03/20/24   Treponema Abs w Reflex to RPR and Titer     Status: Normal   Result Value Ref Range    Treponema Antibody Total Nonreactive Nonreactive   Herpes Simplex Virus 1 and 2 IgG     Status: Abnormal   Result Value Ref Range    HSV Type 1 IgG Instrument Value 1.43 (H) <0.90 Index    Herpes Simplex Virus Type 1 IgG Antibody Positive.  IgG antibody to HSV-1 detected. (A) No HSV-1 IgG antibodies detected    HSV Type 2 IgG Instrument Value 0.37 <0.90 Index    Herpes Simplex Virus Type 2 IgG Antibody No HSV-2 IgG antibodies detected. No HSV-2 IgG antibodies detected   HIV Antigen Antibody Combo     Status: Normal   Result Value Ref Range    HIV Antigen Antibody Combo Nonreactive Nonreactive   HCG qualitative urine     Status: Normal   Result Value Ref Range    hCG Urine Qualitative Negative Negative   Lipid panel reflex to direct LDL Non-fasting     Status: Abnormal   Result Value Ref Range    Cholesterol 177 <200 mg/dL    Triglycerides  230 (H) <150 mg/dL    Direct Measure HDL 45 (L) >=50 mg/dL    LDL Cholesterol Calculated 86 <=100 mg/dL    Non HDL Cholesterol 132 (H) <130 mg/dL    Patient Fasting > 8hrs? No     Narrative    Cholesterol  Desirable:  <200 mg/dL    Triglycerides  Normal:  Less than 150 mg/dL  Borderline High:  150-199 mg/dL  High:  200-499 mg/dL  Very High:  Greater than or equal to 500 mg/dL    Direct Measure HDL  Female:  Greater than or equal to 50 mg/dL   Male:  Greater than or equal to 40 mg/dL    LDL Cholesterol  Desirable:  <100mg/dL  Above Desirable:  100-129 mg/dL   Borderline High:  130-159 mg/dL   High:  160-189 mg/dL   Very High:  >= 190 mg/dL    Non HDL Cholesterol  Desirable:  130 mg/dL  Above Desirable:  130-159 mg/dL  Borderline High:  160-189 mg/dL  High:  190-219 mg/dL  Very High:  Greater than or equal to 220 mg/dL   NEISSERIA GONORRHOEA PCR     Status: Normal    Specimen: Vagina; Swab   Result Value Ref Range    Neisseria gonorrhoeae Negative Negative   CHLAMYDIA TRACHOMATIS PCR     Status: Normal    Specimen: Vagina; Swab   Result Value Ref Range    Chlamydia trachomatis Negative Negative         Phone call duration: 5 minutes  Signed Electronically by: STEPHIE Lord

## 2024-03-20 NOTE — PATIENT INSTRUCTIONS
Jose Fernandez,    Thank you for allowing [facility] to manage your care.    I am unsure of the cause of your symptoms, but we will see what our workup shows.     If you develop worsening/changing symptoms at any time, please be seen in clinic/urgent care or call 911/go to the emergency department for evaluation as we discussed.    I ordered some lab work. Please go to the laboratory at the [facility] later today at 2 PM to have blood work, urine and swabs performed.    Take a probiotic pill, eat live culture yogurt (Greek yogurt or Activia), drink kefir daily for one week after finishing the antibiotics to encourage growth of good bacteria in your system.    Please allow 1-2 business days for our office to contact you in regards to your laboratory/radiological studies.  If not done so, I encourage you to login into Noval[employer] ([email or web address] to review your results as well.     If you have any questions or concerns, please feel free to call us at [phone]    Sincerely,    Desmond Liu PA-C    Did you know?      You can schedule a video visit for follow-up appointments as well as future appointments for certain conditions.  Please see the below link.     [email or web address]    If you have not already done so,  I encourage you to sign up for Dodonationt ([email or web address]).  This will allow you to review your results, securely communicate with a provider, and schedule virtual visits as well.

## 2024-03-21 ENCOUNTER — TELEPHONE (OUTPATIENT)
Dept: FAMILY MEDICINE | Facility: CLINIC | Age: 29
End: 2024-03-21

## 2024-03-21 DIAGNOSIS — N76.0 BACTERIAL VAGINOSIS: Primary | ICD-10-CM

## 2024-03-21 DIAGNOSIS — B96.89 BACTERIAL VAGINOSIS: Primary | ICD-10-CM

## 2024-03-21 LAB
C TRACH DNA SPEC QL NAA+PROBE: NEGATIVE
CHOLEST SERPL-MCNC: 177 MG/DL
FASTING STATUS PATIENT QL REPORTED: NO
HDLC SERPL-MCNC: 45 MG/DL
HIV 1+2 AB+HIV1 P24 AG SERPL QL IA: NONREACTIVE
HSV1 IGG SERPL QL IA: 1.43 INDEX
HSV1 IGG SERPL QL IA: ABNORMAL
HSV2 IGG SERPL QL IA: 0.37 INDEX
HSV2 IGG SERPL QL IA: ABNORMAL
LDLC SERPL CALC-MCNC: 86 MG/DL
N GONORRHOEA DNA SPEC QL NAA+PROBE: NEGATIVE
NONHDLC SERPL-MCNC: 132 MG/DL
TRIGL SERPL-MCNC: 230 MG/DL

## 2024-03-21 RX ORDER — METRONIDAZOLE 500 MG/1
500 TABLET ORAL 2 TIMES DAILY
Qty: 14 TABLET | Refills: 0 | Status: SHIPPED | OUTPATIENT
Start: 2024-03-21 | End: 2024-03-28

## 2024-03-21 RX ORDER — FLUCONAZOLE 150 MG/1
TABLET ORAL
Qty: 2 TABLET | Refills: 0 | Status: SHIPPED | OUTPATIENT
Start: 2024-03-21

## 2024-03-21 NOTE — TELEPHONE ENCOUNTER
----- Message from STEPHIE Lord sent at 3/21/2024  4:11 PM CDT -----  Team - please call patient with results.  Vaginal swab returned with both yeast and bacterial vaginosis.  Fluconazole was already sent to her pharmacy and I have now also sent metronidazole to her pharmacy.  Thanks.

## 2024-03-21 NOTE — RESULT ENCOUNTER NOTE
Team - please call patient with results. As we discussed she was positive for HSV 1, which causes cold sores.Hiv, syphilis, and urine pregnancy test were negative. Unfortunately, the lab did not collect the vaginal multiplex PCR for some reason. My apologies that this did not happen. Let's treat you for a yeast infection with fluconazole and if you are not improving in 2 days, please let us know and we can try to treat you for BV.  To UC/ER if worse. Thanks.

## 2024-03-21 NOTE — TELEPHONE ENCOUNTER
Attempted to call patient at home/mobile number, no answer, left message on voicemail; patient was instructed to return call to [facility] at [phone].    Phone encounter created for call back/follow up.    Mira BONE RN  [facility] Triage

## 2024-03-22 NOTE — TELEPHONE ENCOUNTER
"Patient returning call    Gave result message:    \"Team - please call patient with results.  Vaginal swab returned with both yeast and bacterial vaginosis.  Fluconazole was already sent to her pharmacy and I have now also sent metronidazole to her pharmacy.  Thanks.\"    Patient verbalized understanding    Karie Corral RN  [facility]    "

## 2024-04-16 ENCOUNTER — TELEPHONE (OUTPATIENT)
Dept: FAMILY MEDICINE | Facility: CLINIC | Age: 29
End: 2024-04-16

## 2024-04-16 NOTE — TELEPHONE ENCOUNTER
"Received call from patient. She is calling as she feels as if her BV and yeast infection are coming back, and is now having pain during intercourse.     She is experiencing itchiness and there are dots in her discharge. Patient was seen in Virtual visit on 3/20 for this concern, and prescribed Fluconazole and metronidazole.     Advised to patient she should be seen again for further evaluation as her symptoms have not improved and is worsening.     Per VV on 3/20:  \"If you develop worsening/changing symptoms at any time, please be seen in clinic/urgent care or call 911/go to the emergency department for evaluation as we discussed. \"    Assisted with booking appointment tomorrow at 12:30 pm with NE provider as requested. Advised in the meantime, if symptoms worsen, she should be seen sooner in UC/ED. Patient verbalized understanding and has no further questions at this time.     SPENCER JonesN RN  [facility]  "

## 2024-04-17 ENCOUNTER — OFFICE VISIT (OUTPATIENT)
Dept: FAMILY MEDICINE | Facility: CLINIC | Age: 29
End: 2024-04-17

## 2024-04-17 VITALS
DIASTOLIC BLOOD PRESSURE: 74 MMHG | RESPIRATION RATE: 16 BRPM | HEIGHT: 64 IN | HEART RATE: 80 BPM | OXYGEN SATURATION: 100 % | BODY MASS INDEX: 35.85 KG/M2 | WEIGHT: 210 LBS | SYSTOLIC BLOOD PRESSURE: 128 MMHG | TEMPERATURE: 98 F

## 2024-04-17 DIAGNOSIS — N94.9 VAGINAL DISCOMFORT: ICD-10-CM

## 2024-04-17 DIAGNOSIS — R30.0 DYSURIA: ICD-10-CM

## 2024-04-17 DIAGNOSIS — B37.31 YEAST INFECTION OF THE VAGINA: ICD-10-CM

## 2024-04-17 DIAGNOSIS — N64.4 BREAST PAIN, RIGHT: ICD-10-CM

## 2024-04-17 DIAGNOSIS — Z12.4 CERVICAL CANCER SCREENING: ICD-10-CM

## 2024-04-17 DIAGNOSIS — N89.8 VAGINAL ITCHING: Primary | ICD-10-CM

## 2024-04-17 LAB
ALBUMIN UR-MCNC: NEGATIVE MG/DL
APPEARANCE UR: CLEAR
BACTERIA #/AREA URNS HPF: ABNORMAL /HPF
BILIRUB UR QL STRIP: NEGATIVE
CLUE CELLS: ABNORMAL
COLOR UR AUTO: YELLOW
GLUCOSE UR STRIP-MCNC: NEGATIVE MG/DL
HCG UR QL: NEGATIVE
HGB UR QL STRIP: ABNORMAL
KETONES UR STRIP-MCNC: NEGATIVE MG/DL
LEUKOCYTE ESTERASE UR QL STRIP: NEGATIVE
NITRATE UR QL: NEGATIVE
PH UR STRIP: 5.5 [PH] (ref 5–7)
RBC #/AREA URNS AUTO: ABNORMAL /HPF
SP GR UR STRIP: 1.01 (ref 1–1.03)
TRICHOMONAS, WET PREP: ABNORMAL
UROBILINOGEN UR STRIP-ACNC: 0.2 E.U./DL
WBC #/AREA URNS AUTO: ABNORMAL /HPF
WBC'S/HIGH POWER FIELD, WET PREP: ABNORMAL
YEAST, WET PREP: PRESENT

## 2024-04-17 PROCEDURE — 99214 OFFICE O/P EST MOD 30 MIN: CPT | Performed by: FAMILY MEDICINE

## 2024-04-17 PROCEDURE — 87491 CHLMYD TRACH DNA AMP PROBE: CPT | Performed by: FAMILY MEDICINE

## 2024-04-17 PROCEDURE — G0145 SCR C/V CYTO,THINLAYER,RESCR: HCPCS | Performed by: FAMILY MEDICINE

## 2024-04-17 PROCEDURE — 81025 URINE PREGNANCY TEST: CPT | Performed by: FAMILY MEDICINE

## 2024-04-17 PROCEDURE — 81001 URINALYSIS AUTO W/SCOPE: CPT | Performed by: FAMILY MEDICINE

## 2024-04-17 PROCEDURE — 87591 N.GONORRHOEAE DNA AMP PROB: CPT | Performed by: FAMILY MEDICINE

## 2024-04-17 PROCEDURE — 87210 SMEAR WET MOUNT SALINE/INK: CPT | Performed by: FAMILY MEDICINE

## 2024-04-17 RX ORDER — FLUCONAZOLE 150 MG/1
150 TABLET ORAL ONCE
Qty: 1 TABLET | Refills: 0 | Status: SHIPPED | OUTPATIENT
Start: 2024-04-17 | End: 2024-04-17

## 2024-04-17 RX ORDER — CLOTRIMAZOLE 1 %
1 CREAM WITH APPLICATOR VAGINAL AT BEDTIME
Qty: 1 G | Refills: 0 | Status: SHIPPED | OUTPATIENT
Start: 2024-04-17 | End: 2024-05-07

## 2024-04-17 ASSESSMENT — ASTHMA QUESTIONNAIRES
ACT_TOTALSCORE: 21
QUESTION_5 LAST FOUR WEEKS HOW WOULD YOU RATE YOUR ASTHMA CONTROL: SOMEWHAT CONTROLLED
QUESTION_3 LAST FOUR WEEKS HOW OFTEN DID YOUR ASTHMA SYMPTOMS (WHEEZING, COUGHING, SHORTNESS OF BREATH, CHEST TIGHTNESS OR PAIN) WAKE YOU UP AT NIGHT OR EARLIER THAN USUAL IN THE MORNING: ONCE OR TWICE
QUESTION_4 LAST FOUR WEEKS HOW OFTEN HAVE YOU USED YOUR RESCUE INHALER OR NEBULIZER MEDICATION (SUCH AS ALBUTEROL): NOT AT ALL
QUESTION_1 LAST FOUR WEEKS HOW MUCH OF THE TIME DID YOUR ASTHMA KEEP YOU FROM GETTING AS MUCH DONE AT WORK, SCHOOL OR AT HOME: NONE OF THE TIME
ACT_TOTALSCORE: 21
QUESTION_2 LAST FOUR WEEKS HOW OFTEN HAVE YOU HAD SHORTNESS OF BREATH: ONCE OR TWICE A WEEK

## 2024-04-17 NOTE — PATIENT INSTRUCTIONS
Your labs showing yeast , take on more diflucan and use cream as discussed    Get breast ultrasound, breast specialist will order mammogram if needed after the ultrasound  Pay close attention with caffeine and also menstral times for breast time    Finish BV medication, use vaginal anti yeast cream for the outside for now    Follow up for physical and recheck all symptoms in the next few weeks  Take care  Geni Alberts D.O.            Patient Education

## 2024-04-18 LAB
C TRACH DNA SPEC QL PROBE+SIG AMP: NEGATIVE
N GONORRHOEA DNA SPEC QL NAA+PROBE: NEGATIVE

## 2024-04-19 LAB
BKR LAB AP GYN ADEQUACY: NORMAL
BKR LAB AP GYN INTERPRETATION: NORMAL
BKR LAB AP HPV REFLEX: NORMAL
BKR LAB AP PREVIOUS ABNORMAL: NORMAL
PATH REPORT.COMMENTS IMP SPEC: NORMAL
PATH REPORT.COMMENTS IMP SPEC: NORMAL
PATH REPORT.RELEVANT HX SPEC: NORMAL

## 2024-04-24 ENCOUNTER — OFFICE VISIT (OUTPATIENT)
Dept: FAMILY MEDICINE | Facility: CLINIC | Age: 29
End: 2024-04-24

## 2024-04-24 VITALS
WEIGHT: 209 LBS | TEMPERATURE: 98 F | BODY MASS INDEX: 35.68 KG/M2 | HEART RATE: 74 BPM | RESPIRATION RATE: 16 BRPM | OXYGEN SATURATION: 100 % | DIASTOLIC BLOOD PRESSURE: 72 MMHG | SYSTOLIC BLOOD PRESSURE: 126 MMHG | HEIGHT: 64 IN

## 2024-04-24 DIAGNOSIS — E66.812 CLASS 2 SEVERE OBESITY DUE TO EXCESS CALORIES WITH SERIOUS COMORBIDITY AND BODY MASS INDEX (BMI) OF 35.0 TO 35.9 IN ADULT (H): ICD-10-CM

## 2024-04-24 DIAGNOSIS — Z00.00 ROUTINE GENERAL MEDICAL EXAMINATION AT A HEALTH CARE FACILITY: Primary | ICD-10-CM

## 2024-04-24 DIAGNOSIS — Z13.1 SCREENING FOR DIABETES MELLITUS: ICD-10-CM

## 2024-04-24 DIAGNOSIS — F17.210 CIGARETTE NICOTINE DEPENDENCE WITHOUT COMPLICATION: ICD-10-CM

## 2024-04-24 DIAGNOSIS — R03.0 ELEVATED BLOOD PRESSURE READING WITHOUT DIAGNOSIS OF HYPERTENSION: ICD-10-CM

## 2024-04-24 DIAGNOSIS — N89.8 VAGINAL DISCHARGE: ICD-10-CM

## 2024-04-24 DIAGNOSIS — Z13.29 SCREENING FOR THYROID DISORDER: ICD-10-CM

## 2024-04-24 DIAGNOSIS — F17.200 TOBACCO USE DISORDER: ICD-10-CM

## 2024-04-24 DIAGNOSIS — E66.01 CLASS 2 SEVERE OBESITY DUE TO EXCESS CALORIES WITH SERIOUS COMORBIDITY AND BODY MASS INDEX (BMI) OF 35.0 TO 35.9 IN ADULT (H): ICD-10-CM

## 2024-04-24 DIAGNOSIS — E78.00 HIGH CHOLESTEROL: ICD-10-CM

## 2024-04-24 DIAGNOSIS — F43.23 ADJUSTMENT DISORDER WITH MIXED ANXIETY AND DEPRESSED MOOD: ICD-10-CM

## 2024-04-24 LAB
BASOPHILS # BLD AUTO: 0 10E3/UL (ref 0–0.2)
BASOPHILS NFR BLD AUTO: 0 %
CLUE CELLS: ABNORMAL
EOSINOPHIL # BLD AUTO: 0.1 10E3/UL (ref 0–0.7)
EOSINOPHIL NFR BLD AUTO: 2 %
ERYTHROCYTE [DISTWIDTH] IN BLOOD BY AUTOMATED COUNT: 11.6 % (ref 10–15)
HBA1C MFR BLD: 5.1 % (ref 0–5.6)
HCT VFR BLD AUTO: 42 % (ref 35–47)
HGB BLD-MCNC: 14 G/DL (ref 11.7–15.7)
IMM GRANULOCYTES # BLD: 0 10E3/UL
IMM GRANULOCYTES NFR BLD: 0 %
LYMPHOCYTES # BLD AUTO: 3 10E3/UL (ref 0.8–5.3)
LYMPHOCYTES NFR BLD AUTO: 44 %
MCH RBC QN AUTO: 31.4 PG (ref 26.5–33)
MCHC RBC AUTO-ENTMCNC: 33.3 G/DL (ref 31.5–36.5)
MCV RBC AUTO: 94 FL (ref 78–100)
MONOCYTES # BLD AUTO: 0.5 10E3/UL (ref 0–1.3)
MONOCYTES NFR BLD AUTO: 8 %
NEUTROPHILS # BLD AUTO: 3.2 10E3/UL (ref 1.6–8.3)
NEUTROPHILS NFR BLD AUTO: 46 %
PLATELET # BLD AUTO: 282 10E3/UL (ref 150–450)
RBC # BLD AUTO: 4.46 10E6/UL (ref 3.8–5.2)
TRICHOMONAS, WET PREP: ABNORMAL
WBC # BLD AUTO: 6.9 10E3/UL (ref 4–11)
WBC'S/HIGH POWER FIELD, WET PREP: ABNORMAL
YEAST, WET PREP: ABNORMAL

## 2024-04-24 PROCEDURE — 87210 SMEAR WET MOUNT SALINE/INK: CPT | Performed by: FAMILY MEDICINE

## 2024-04-24 PROCEDURE — 99395 PREV VISIT EST AGE 18-39: CPT | Performed by: FAMILY MEDICINE

## 2024-04-24 PROCEDURE — 80061 LIPID PANEL: CPT | Performed by: FAMILY MEDICINE

## 2024-04-24 PROCEDURE — 83036 HEMOGLOBIN GLYCOSYLATED A1C: CPT | Performed by: FAMILY MEDICINE

## 2024-04-24 PROCEDURE — 99214 OFFICE O/P EST MOD 30 MIN: CPT | Mod: 25 | Performed by: FAMILY MEDICINE

## 2024-04-24 PROCEDURE — 85025 COMPLETE CBC W/AUTO DIFF WBC: CPT | Performed by: FAMILY MEDICINE

## 2024-04-24 PROCEDURE — 36415 COLL VENOUS BLD VENIPUNCTURE: CPT | Performed by: FAMILY MEDICINE

## 2024-04-24 PROCEDURE — 84443 ASSAY THYROID STIM HORMONE: CPT | Performed by: FAMILY MEDICINE

## 2024-04-24 RX ORDER — NICOTINE 21 MG/24HR
1 PATCH, TRANSDERMAL 24 HOURS TRANSDERMAL EVERY 24 HOURS
Qty: 28 PATCH | Refills: 2 | Status: SHIPPED | OUTPATIENT
Start: 2024-04-24

## 2024-04-24 RX ORDER — BUPROPION HYDROCHLORIDE 150 MG/1
150 TABLET ORAL EVERY MORNING
Qty: 90 TABLET | Refills: 0 | Status: SHIPPED | OUTPATIENT
Start: 2024-04-24

## 2024-04-24 SDOH — HEALTH STABILITY: PHYSICAL HEALTH: ON AVERAGE, HOW MANY DAYS PER WEEK DO YOU ENGAGE IN MODERATE TO STRENUOUS EXERCISE (LIKE A BRISK WALK)?: 0 DAYS

## 2024-04-24 ASSESSMENT — SOCIAL DETERMINANTS OF HEALTH (SDOH): HOW OFTEN DO YOU GET TOGETHER WITH FRIENDS OR RELATIVES?: ONCE A WEEK

## 2024-04-24 ASSESSMENT — PAIN SCALES - GENERAL: PAINLEVEL: NO PAIN (0)

## 2024-04-24 NOTE — PATIENT INSTRUCTIONS
Your initial kabs are normal  Consider getting pneumonia shot today-please let us  know after talking with your mom     Start wellbuytrin early in am daily  as it affects sleep  Follow up in 4-6 weeks to reevaluate  Take care  Geni Alberts D.O.        Patient Education     Preventive Care Advice   This is general advice given by our system to help you stay healthy. However, your care team may have specific advice just for you. Please talk to your care team about your preventive care needs.  Nutrition  Eat 5 or more servings of fruits and vegetables each day.  Try wheat bread, brown rice and whole grain pasta (instead of white bread, rice, and pasta).  Get enough calcium and vitamin D. Check the label on foods and aim for 100% of the RDA (recommended daily allowance).  Lifestyle  Exercise at least 150 minutes each week   (30 minutes a day, 5 days a week).  Do muscle strengthening activities 2 days a week. These help control your weight and prevent disease.  No smoking.  Wear sunscreen to prevent skin cancer.  Have a dental exam and cleaning every 6 months.  Yearly exams  See your health care team every year to talk about:  Any changes in your health.  Any medicines your care team has prescribed.  Preventive care, family planning, and ways to prevent chronic diseases.  Shots (vaccines)   HPV shots (up to age 26), if you've never had them before.  Hepatitis B shots (up to age 59), if you've never had them before.  COVID-19 shot: Get this shot when it's due.  Flu shot: Get a flu shot every year.  Tetanus shot: Get a tetanus shot every 10 years.  Pneumococcal, hepatitis A, and RSV shots: Ask your care team if you need these based on your risk.  Shingles shot (for age 50 and up).  General health tests  Diabetes screening:  Starting at age 35, Get screened for diabetes at least every 3 years.  If you are younger than age 35, ask your care team if you should be screened for diabetes.  Cholesterol test: At age 39, start  having a cholesterol test every 5 years, or more often if advised.  Bone density scan (DEXA): At age 50, ask your care team if you should have this scan for osteoporosis (brittle bones).  Hepatitis C: Get tested at least once in your life.  STIs (sexually transmitted infections)  Before age 24: Ask your care team if you should be screened for STIs.  After age 24: Get screened for STIs if you're at risk. You are at risk for STIs (including HIV) if:  You are sexually active with more than one person.  You don't use condoms every time.  You or a partner was diagnosed with a sexually transmitted infection.  If you are at risk for HIV, ask about PrEP medicine to prevent HIV.  Get tested for HIV at least once in your life, whether you are at risk for HIV or not.  Cancer screening tests  Cervical cancer screening: If you have a cervix, begin getting regular cervical cancer screening tests at age 21. Most people who have regular screenings with normal results can stop after age 65. Talk about this with your provider.  Breast cancer scan (mammogram): If you've ever had breasts, begin having regular mammograms starting at age 40. This is a scan to check for breast cancer.  Colon cancer screening: It is important to start screening for colon cancer at age 45.  Have a colonoscopy test every 10 years (or more often if you're at risk) Or, ask your provider about stool tests like a FIT test every year or Cologuard test every 3 years.  To learn more about your testing options, visit: [email or web address]/[email or web address].  For help making a decision, visit: https://bit.ly/mw56019.  Prostate cancer screening test: If you have a prostate and are age 55 to 69, ask your provider if you would benefit from a yearly prostate cancer screening test.  Lung cancer screening: If you are a current or former smoker age 50 to 80, ask your care team if ongoing lung cancer screenings are right for you.  For informational purposes only. Not to replace  the advice of your health care provider. Copyright   2023 [facility]. All rights reserved. Clinically reviewed by the [facility] Transitions Program. TrackR 880460 - REV 01/24.    Learning About Stress  What is stress?     Stress is your body's response to a hard situation. Your body can have a physical, emotional, or mental response. Stress is a fact of life for most people, and it affects everyone differently. What causes stress for you may not be stressful for someone else.  A lot of things can cause stress. You may feel stress when you go on a job interview, take a test, or run a race. This kind of short-term stress is normal and even useful. It can help you if you need to work hard or react quickly. For example, stress can help you finish an important job on time.  Long-term stress is caused by ongoing stressful situations or events. Examples of long-term stress include long-term health problems, ongoing problems at work, or conflicts in your family. Long-term stress can harm your health.  How does stress affect your health?  When you are stressed, your body responds as though you are in danger. It makes hormones that speed up your heart, make you breathe faster, and give you a burst of energy. This is called the fight-or-flight stress response. If the stress is over quickly, your body goes back to normal and no harm is done.  But if stress happens too often or lasts too long, it can have bad effects. Long-term stress can make you more likely to get sick, and it can make symptoms of some diseases worse. If you tense up when you are stressed, you may develop neck, shoulder, or low back pain. Stress is linked to high blood pressure and heart disease.  Stress also harms your emotional health. It can make you schrader, tense, or depressed. Your relationships may suffer, and you may not do well at work or school.  What can you do to manage stress?  You can try these things to help manage  stress:   Do something active. Exercise or activity can help reduce stress. Walking is a great way to get started. Even everyday activities such as housecleaning or yard work can help.  Try yoga or keiry chi. These techniques combine exercise and meditation. You may need some training at first to learn them.  Do something you enjoy. For example, listen to music or go to a movie. Practice your hobby or do volunteer work.  Meditate. This can help you relax, because you are not worrying about what happened before or what may happen in the future.  Do guided imagery. Imagine yourself in any setting that helps you feel calm. You can use online videos, books, or a teacher to guide you.  Do breathing exercises. For example:  From a standing position, bend forward from the waist with your knees slightly bent. Let your arms dangle close to the floor.  Breathe in slowly and deeply as you return to a standing position. Roll up slowly and lift your head last.  Hold your breath for just a few seconds in the standing position.  Breathe out slowly and bend forward from the waist.  Let your feelings out. Talk, laugh, cry, and express anger when you need to. Talking with supportive friends or family, a counselor, or a ruby leader about your feelings is a healthy way to relieve stress. Avoid discussing your feelings with people who make you feel worse.  Write. It may help to write about things that are bothering you. This helps you find out how much stress you feel and what is causing it. When you know this, you can find better ways to cope.  What can you do to prevent stress?  You might try some of these things to help prevent stress:  Manage your time. This helps you find time to do the things you want and need to do.  Get enough sleep. Your body recovers from the stresses of the day while you are sleeping.  Get support. Your family, friends, and community can make a difference in how you experience stress.  Limit your news feed.  "Avoid or limit time on social media or news that may make you feel stressed.  Do something active. Exercise or activity can help reduce stress. Walking is a great way to get started.  Where can you learn more?  Go to https://www.[facility].net/patiented  Enter N032 in the search box to learn more about \"Learning About Stress.\"  Current as of: October 24, 2023               Content Version: 14.0    6952-4690 Openbay.   Care instructions adapted under license by your healthcare professional. If you have questions about a medical condition or this instruction, always ask your healthcare professional. Openbay disclaims any warranty or liability for your use of this information.      Substance Use Disorder: Care Instructions  Overview     You can improve your life and health by stopping your use of alcohol or drugs. When you don't drink or use drugs, you may feel and sleep better. You may get along better with your family, friends, and coworkers. There are medicines and programs that can help with substance use disorder.  How can you care for yourself at home?  Here are some ways to help you stay sober and prevent relapse.  If you have been given medicine to help keep you sober or reduce your cravings, be sure to take it exactly as prescribed.  Talk to your doctor about programs that can help you stop using drugs or drinking alcohol.  Do not keep alcohol or drugs in your home.  Plan ahead. Think about what you'll say if other people ask you to drink or use drugs. Try not to spend time with people who drink or use drugs.  Use the time and money spent on drinking or drugs to do something that's important to you.  Preventing a relapse  Have a plan to deal with relapse. Learn to recognize changes in your thinking that lead you to drink or use drugs. Get help before you start to drink or use drugs again.  Try to stay away from situations, friends, or places that may lead you to drink or use " drugs.  If you feel the need to drink alcohol or use drugs again, seek help right away. Call a trusted friend or family member. Some people get support from organizations such as Narcotics Anonymous or [employer] or from treatment facilities.  If you relapse, get help as soon as you can. Some people make a plan with another person that outlines what they want that person to do for them if they relapse. The plan usually includes how to handle the relapse and who to notify in case of relapse.  Don't give up. Remember that a relapse doesn't mean that you have failed. Use the experience to learn the triggers that lead you to drink or use drugs. Then quit again. Recovery is a lifelong process. Many people have several relapses before they are able to quit for good.  Follow-up care is a key part of your treatment and safety. Be sure to make and go to all appointments, and call your doctor if you are having problems. It's also a good idea to know your test results and keep a list of the medicines you take.  When should you call for help?   Call 911  anytime you think you may need emergency care. For example, call if you or someone else:    Has overdosed or has withdrawal signs. Be sure to tell the emergency workers that you are or someone else is using or trying to quit using drugs. Overdose or withdrawal signs may include:  Losing consciousness.  Seizure.  Seeing or hearing things that aren't there (hallucinations).     Is thinking or talking about suicide or harming others.   Where to get help 24 hours a day, 7 days a week   If you or someone you know talks about suicide, self-harm, a mental health crisis, a substance use crisis, or any other kind of emotional distress, get help right away. You can:    Call the Suicide and Crisis Lifeline at 988.     Call 2-777-145-TALK (1-831.176.5260).     Text HOME to 888092 to access the Crisis Text Line.   Consider saving these numbers in your phone.  Go to VeriCorder Technology.org for  "more information or to chat online.  Call your doctor now or seek immediate medical care if:    You are having withdrawal symptoms. These may include nausea or vomiting, sweating, shakiness, and anxiety.   Watch closely for changes in your health, and be sure to contact your doctor if:    You have a relapse.     You need more help or support to stop.   Where can you learn more?  Go to https://www.Arecont Vision.net/patiented  Enter H573 in the search box to learn more about \"Substance Use Disorder: Care Instructions.\"  Current as of: November 15, 2023               Content Version: 14.0    6510-7073 Klout.   Care instructions adapted under license by your healthcare professional. If you have questions about a medical condition or this instruction, always ask your healthcare professional. Klout disclaims any warranty or liability for your use of this information.      "

## 2024-04-24 NOTE — COMMUNITY RESOURCES LIST (ENGLISH)
April 24, 2024           YOUR PERSONALIZED LIST OF SERVICES & PROGRAMS           NAVIGATION    Eligibility Screening      [facility] application assistance [address] [address] (Distance: 5.7 miles)  Phone: [phone]  Language: English, Vietnamese  Fee: Free      [facility] application assistance  [address] [place] [id number] (Distance: 5.7 miles)  Language: English  Fee: Free      [employer]  Phone: [phone]  Website: https://www.[facility].org/about-us/assister-program/navigators/index.jsp  Language: English  Hours: Mon 8:00 AM - 4:00 PM Tue 8:00 AM - 4:00 PM Wed 8:00 AM - 4:00 PM Thu 8:00 AM - 4:00 PM        ASSISTANCE    Nutrition Benefits      [facility] application [address] [address] (Distance: 5.7 miles)  Phone: [phone]  Language: English, Vietnamese  Fee: Free      [facility] application assistance  [address] [place] [phone] (Distance: 5.7 miles)  Language: English  Fee: Free      [employer] [place] [employer]  Phone: [phone]  Website: https://www.VM6 Softwaresolutions.org/programs/market-"S[employer]One[employer]/  Language: English  Hours: Mon 10:00 AM - 5:00 PM Tue 10:00 AM - 5:00 PM Wed 10:00 AM - 5:00 PM Thu 10:00 AM - 5:00 PM Fri 10:00 AM - 5:00 PM  Fee: Self pay    Pantry      in the Saez - Food in the 'Seaz at [facility]  [address] [place] [address] (Distance: 14.0 miles)  Phone: [phone]  Website: https://www.goodintNetfective Technologyhood.org/our-programs/feeding-the-future/food-in-the-saez/  Language: English  Fee: Free  Accessibility: Ada accessible      [facility] - Food pantry  [address] [place] [address] (Distance: 4.8 miles)  Phone: [phone]  Website: http://www.Rebelle/  Language: English   Fee: Free      [employer]ORS  Phone: [phone]) [phone]  Website: https://www.AgenTec.org/empowerment-food-bank  Language: English  Hours: Mon 9:00 AM - 5:00 PM Tue 9:00 AM - 5:00 PM Wed 9:00 AM - 5:00 PM Thu 9:00 AM - 5:00 PM Fri 9:00 AM - 5:00 PM  Fee: Free        & RECREATION    Sports      [facility]  [address] [place] [address] (Distance: 6.4 miles)  Phone: [phone]  Website: https://zervedBatavia Veterans Administration HospitalEpoq/seniors/  Language: English, Mongolian  Fee: Free, Self pay, Sliding scale  Accessibility: Translation services      of the [place] - Sports clubs and recreational activities - [facility]  [address] [place] [address] (Distance: 2.6 miles)  Language: English  Fee: Self pay, Sliding scale      [facility] - Adult Enrichment  Phone: [phone]  Website: https://Cardax Pharma/adults-seniors/adult-enrichment/  Language: English  Hours: Mon 7:30 AM - 4:00 PM Tue 7:30 AM - 4:00 PM Wed 7:30 AM - 4:00 PM Thu 7:30 AM - 4:00 PM Fri 7:30 AM - 4:00 PM    Classes/Groups      [facility] - Personal Training  [address] [place] [address] (Distance: 2.6 miles)  Phone: [phone]  Website: https://www.[facility].org/[facility]/health__fitness/personal_training  Language: English      [facility] - Personal training  [address] [place] [address] (Distance: 12.5 miles)  Phone: [phone]  Website: https://Etransmedia Technology.UFOstart AG/  Language: English, Mongolian  Fee: Sliding scale, Self pay, Insurance      [facility] - Adult Enrichment  Phone: [phone]  Website: https://slpcommunityed.com/adults-seniors/adult-enrichment/  Language: English  Hours: Mon 7:30 AM - 4:00 PM Tue 7:30 AM - 4:00 PM Wed 7:30 AM - 4:00 PM Thu 7:30 AM - 4:00 PM Fri 7:30 AM - 4:00 PM               IMPORTANT NUMBERS &  WEBSITES        Emergency Services  911  .   [facility]  211 http://211unitedway.org  .   Poison Control  (972) 616-3891 http://mnpoison.org http://wisconsinpoison.org  .     Suicide and Crisis Lifeline  988 http://988lifeline.org  .   Childhelp [place] Child Abuse Hotline  316.738.8353 http://Childhelphotline.org   .   National Sexual Assault Hotline  (346) 651-1959 (HOPE) http://Clipper Windpowern.Fingo   .     Grace Runaway Safeline  (725) 697-4918 (RUNAWAY) http://Oviceversa.Fingo  .   Pregnancy & Postpartum Support  Call/text 088-288-8654  [place]: http://ppsupportmn.org  [place]: http://Sentry Wireless.com/wi  .   Substance Abuse National Helpline ([facility])  169-687-HELP (6694) http://Findtreatment.gov   .                DISCLAIMER: These resources have been generated via the ThinkHR Platform. ThinkHR does not endorse any service providers mentioned in this resource list. ThinkHR does not guarantee that the services mentioned in this resource list will be available to you or will improve your health or wellness.    [facility]

## 2024-04-24 NOTE — PROGRESS NOTES
Preventive Care Visit  [facility]  Griffinarminbecca Gilles Alberts DO, Family Medicine  Apr 24, 2024      1. Routine general medical examination at a health care facility  Routine preventative test ordered patient declined vaccines today,  - CBC with platelets and differential; Future  - CBC with platelets and differential    2. Adjustment disorder with mixed anxiety and depressed mood  Discussed at length risks and benefits of medication.  Patient is a smoker with a history of obesity.  Benefit of Wellbutrin for these indications reviewed.  Follow-up in 6 weeks to recheck  - buPROPion (WELLBUTRIN XL) 150 MG 24 hr tablet; Take 1 tablet (150 mg) by mouth every morning  Dispense: 90 tablet; Refill: 0    3. Class 2 severe obesity due to excess calories with serious comorbidity and body mass index (BMI) of 35.0 to 35.9 in adult (H)  Patient does have a weight management clinic that she wants to follow-up with.  We did discuss briefly naltrexone with Wellbutrin being as one of the medications to be used for weight management  - Hemoglobin A1c; Future  - TSH with free T4 reflex; Future  - Hemoglobin A1c  - TSH with free T4 reflex  - buPROPion (WELLBUTRIN XL) 150 MG 24 hr tablet; Take 1 tablet (150 mg) by mouth every morning  Dispense: 90 tablet; Refill: 0    4. Cigarette nicotine dependence without complication  As above, she would like to try and she is Wellbutrin and potentially nicotine patch    5. Elevated blood pressure reading without diagnosis of hypertension  Weight management advised.  Dietary and lifestyle changes discussed    6. Tobacco use disorder  As above  - nicotine (NICODERM CQ) 21 MG/24HR 24 hr patch; Place 1 patch onto the skin every 24 hours  Dispense: 28 patch; Refill: 2  - buPROPion (WELLBUTRIN XL) 150 MG 24 hr tablet; Take 1 tablet (150 mg) by mouth every morning  Dispense: 90 tablet; Refill: 0    7. Vaginal discharge  Resolved with treatment   - Wet prep - Clinic Collect    8.  High cholesterol  Dietary and lifestyle changes advised  - Lipid panel reflex to direct LDL Fasting; Future  - TSH with free T4 reflex; Future  - Lipid panel reflex to direct LDL Fasting  - TSH with free T4 reflex    9. Screening for diabetes mellitus    - Hemoglobin A1c; Future  - Hemoglobin A1c    10. Screening for thyroid disorder    - TSH with free T4 reflex; Future  - TSH with free T4 reflex      Harpreet Fernandez is a 28 year old, presenting for the following:  Physical     Completed BV medication.   Yeast pill taaken two days ago.   Used cream for 7 days   Patient reports no lingering symptoms.     Health Care Directive  Patient does not have a Health Care Directive or Living Will: Discussed advance care planning with patient; however, patient declined at this time.    HPI  Resolving symptoms  History of smoking  Anxiety   Weight issies  Does not want contraception        10/30/2023   Nutrition   Three or more servings of calcium each day? No   Diet: regular (no restrictions)    breakfast skipped         10/30/2023   Exercise   Frequency of exercise: None         10/30/2023   Social Factors   Worry food won't last until get money to buy more No   Food not last or not have enough money for food? No   Do you have housing?  No   Are you worried about losing your housing? No   Lack of transportation? No   Unable to get utilities (heat,electricity)? No         10/30/2023   Dental   Dentist two times every year? Yes              Today's PHQ-2 Score:       4/24/2024    10:42 AM   PHQ-2 ( 1999 [employer])   Q1: Little interest or pleasure in doing things 1   Q2: Feeling down, depressed or hopeless 0   PHQ-2 Score 1   Q1: Little interest or pleasure in doing things Several days   Q2: Feeling down, depressed or hopeless Not at all   PHQ-2 Score 1         10/30/2023   Substance Use   If I could quit smoking, I would Completely agree   I want to quit somking, worry about health affects Completely agree   Willing to make  "a plan to quit smoking Somewhat agree   Willing to cut down before quitting Completely agree   Alcohol more than 3/day or more than 7/wk Not Applicable     Social History     Tobacco Use    Smoking status: Former     Current packs/day: 0.50     Types: Cigarettes     Passive exposure: Current    Smokeless tobacco: Never    Tobacco comments:     vapes   Vaping Use    Vaping status: Every Day    Substances: Nicotine, THC, Flavoring    Devices: Pre-filled or refillable cartridge, Refillable tank, Pre-filled pod    Passive vaping exposure: Yes   Substance Use Topics    Alcohol use: Not Currently     Comment: Alcoholic Drinks/day: \"not really\"    Drug use: Yes     Types: Marijuana     Comment: Drug use: smokes daily           10/30/2023   LAST FHS-7 RESULTS   1st degree relative breast or ovarian cancer No   Any relative bilateral breast cancer Unknown   Any male have breast cancer Unknown   Any ONE woman have BOTH breast AND ovarian cancer Yes   Any woman with breast cancer before 50yrs Yes   2 or more relatives with breast AND/OR ovarian cancer Unknown   2 or more relatives with breast AND/OR bowel cancer Unknown        Mammogram Screening - Patient under 40 years of age: Routine Mammogram Screening not recommended.       History of abnormal Pap smear: NO - age 30- 65 PAP every 3 years recommended        2024    12:42 PM 2020     5:57 PM   PAP / HPV   PAP Negative for Intraepithelial Lesion or Malignancy (NILM)  Negative for squamous intraepithelial lesion or malignancy  Electronically signed by Romie Toledo CT (ASCP) on 2020 at 11:32 AM               No data to display                 Reviewed and updated as needed this visit by Provider                    Past Medical History:   Diagnosis Date     (normal spontaneous vaginal delivery) 2016    Supervision of high risk pregnancy in third trimester 2016    Overview:  [facility]  TESTING ONLY   NEXT VISIT ALERTS:  More " "testing?-order unclear    PRIMARY DIAGNOSIS: 20 y.o.        Estimated Date of Delivery: 16  Elevated BP Daily THC use-to control nausea, sleep and coping with anxiety Depression-on Zoloft H/O smoking  TESTING PLANS:   BPP/NST  LAST GROWTH:   PRIMARY PHYSICIAN/PHONE:  Dr White [id number]  PERTINENT LABS: B+ 11/2/15, 3/2/16, 16      Past Surgical History:   Procedure Laterality Date    TONSILLECTOMY       OB History    Para Term  AB Living   2 1 1 0 0 0   SAB IAB Ectopic Multiple Live Births   0 0 0 0 0      # Outcome Date GA Lbr Isiah/2nd Weight Sex Type Anes PTL Lv   2             1 Term                  Review of Systems  Constitutional, HEENT, cardiovascular, pulmonary, GI, , musculoskeletal, neuro, skin, endocrine and psych systems are negative, except as otherwise noted.     Objective    Exam  LMP 2024 (Within Days)    Estimated body mass index is 36.05 kg/m  as calculated from the following:    Height as of 24: 1.626 m (5' 4\").    Weight as of 24: 95.3 kg (210 lb).    Physical Exam  GENERAL: alert and no distress  EYES: Eyes grossly normal to inspection, PERRL and conjunctivae and sclerae normal  HENT: ear canals and TM's normal, nose and mouth without ulcers or lesions  NECK: no adenopathy, no asymmetry, masses, or scars  RESP: lungs clear to auscultation - no rales, rhonchi or wheezes  BREAST: normal without masses, tenderness or nipple discharge and no palpable axillary masses or adenopathy  CV: regular rate and rhythm, normal S1 S2, no S3 or S4, no murmur, click or rub, no peripheral edema  ABDOMEN: soft, nontender, no hepatosplenomegaly, no masses and bowel sounds normal   (female): normal female external genitalia, normal urethral meatus, normal vaginal mucosa  MS: no gross musculoskeletal defects noted, no edema  SKIN: no suspicious lesions or rashes  NEURO: Normal strength and tone, mentation intact and speech normal  PSYCH: mentation appears normal, " affect normal/bright    Results for orders placed or performed in visit on 04/24/24   Hemoglobin A1c     Status: Normal   Result Value Ref Range    Hemoglobin A1C 5.1 0.0 - 5.6 %   CBC with platelets and differential     Status: None   Result Value Ref Range    WBC Count 6.9 4.0 - 11.0 10e3/uL    RBC Count 4.46 3.80 - 5.20 10e6/uL    Hemoglobin 14.0 11.7 - 15.7 g/dL    Hematocrit 42.0 35.0 - 47.0 %    MCV 94 78 - 100 fL    MCH 31.4 26.5 - 33.0 pg    MCHC 33.3 31.5 - 36.5 g/dL    RDW 11.6 10.0 - 15.0 %    Platelet Count 282 150 - 450 10e3/uL    % Neutrophils 46 %    % Lymphocytes 44 %    % Monocytes 8 %    % Eosinophils 2 %    % Basophils 0 %    % Immature Granulocytes 0 %    Absolute Neutrophils 3.2 1.6 - 8.3 10e3/uL    Absolute Lymphocytes 3.0 0.8 - 5.3 10e3/uL    Absolute Monocytes 0.5 0.0 - 1.3 10e3/uL    Absolute Eosinophils 0.1 0.0 - 0.7 10e3/uL    Absolute Basophils 0.0 0.0 - 0.2 10e3/uL    Absolute Immature Granulocytes 0.0 <=0.4 10e3/uL   Wet prep - Clinic Collect     Status: Abnormal    Specimen: Vagina; Swab   Result Value Ref Range    Trichomonas Absent Absent    Yeast Absent Absent    Clue Cells Absent Absent    WBCs/high power field 1+ (A) None   CBC with platelets and differential     Status: None    Narrative    The following orders were created for panel order CBC with platelets and differential.  Procedure                               Abnormality         Status                     ---------                               -----------         ------                     CBC with platelets and d...[[id number]]                      Final result                 Please view results for these tests on the individual orders.           Signed Electronically by: Geni Alberts DO

## 2024-04-25 LAB
CHOLEST SERPL-MCNC: 178 MG/DL
FASTING STATUS PATIENT QL REPORTED: NO
HDLC SERPL-MCNC: 46 MG/DL
LDLC SERPL CALC-MCNC: 111 MG/DL
NONHDLC SERPL-MCNC: 132 MG/DL
TRIGL SERPL-MCNC: 105 MG/DL
TSH SERPL DL<=0.005 MIU/L-ACNC: 0.91 UIU/ML (ref 0.3–4.2)

## 2024-04-27 NOTE — RESULT ENCOUNTER NOTE
"Your cholesterol is abnormal, please use the recommendations below and recheck labs in 6-12 months.    Ways to improve your cholesterol...    1- Eats less saturated fats (including avoiding \"trans\" fats).    2 - Eat more unsaturated fats  - found in vege  tables, grains, and tree nuts.   Also by replacing butter with canola oil or olive oil.    3 - Eat more nuts.   1-2 ounces (a small handful) of almonds, walnuts, hazelnuts or pecans once a  day in place of other less healthy snacks.    4 - Eat more high   fiber foods - vegetables and whole grains including oat bran, oats, beans, peas, and flax seed.    5 - Eat more fish - such as salmon, tuna, mackerel, and sardines.  1 or 2 six ounce servings per week is a healthy replacement for other proteins.    6 - E  xercise for at least 120 minutes per week - which is equal to 30 minutes 4 days per week.    Geni Alberts D.O.    "

## 2024-05-20 NOTE — LETTER
October 9, 2023      Rebecca Salas  [address] [place] [address] [address]    [facility] [address]        To Whom It May Concern,       Rebecca should be excused from work 10/6/2023 due to illness.  He has now recovered and may return to work without restriction.        Sincerely,        Toñito Arnett MD           Patient called and confirmed that lab orders are active and patient can have drawn at next OB appointment.

## 2025-01-21 ENCOUNTER — OFFICE VISIT (OUTPATIENT)
Dept: URGENT CARE | Facility: URGENT CARE | Age: 30
End: 2025-01-21

## 2025-01-21 VITALS
TEMPERATURE: 98.1 F | OXYGEN SATURATION: 97 % | BODY MASS INDEX: 32.61 KG/M2 | DIASTOLIC BLOOD PRESSURE: 88 MMHG | SYSTOLIC BLOOD PRESSURE: 142 MMHG | WEIGHT: 190 LBS | HEART RATE: 74 BPM | RESPIRATION RATE: 16 BRPM

## 2025-01-21 DIAGNOSIS — B96.89 BV (BACTERIAL VAGINOSIS): Primary | ICD-10-CM

## 2025-01-21 DIAGNOSIS — Z11.3 SCREEN FOR STD (SEXUALLY TRANSMITTED DISEASE): ICD-10-CM

## 2025-01-21 DIAGNOSIS — N76.0 BV (BACTERIAL VAGINOSIS): Primary | ICD-10-CM

## 2025-01-21 LAB
CLUE CELLS: PRESENT
TRICHOMONAS, WET PREP: ABNORMAL
WBC'S/HIGH POWER FIELD, WET PREP: ABNORMAL
YEAST, WET PREP: ABNORMAL

## 2025-01-21 PROCEDURE — 86780 TREPONEMA PALLIDUM: CPT | Performed by: PHYSICIAN ASSISTANT

## 2025-01-21 PROCEDURE — 36415 COLL VENOUS BLD VENIPUNCTURE: CPT | Performed by: PHYSICIAN ASSISTANT

## 2025-01-21 PROCEDURE — 87491 CHLMYD TRACH DNA AMP PROBE: CPT | Performed by: PHYSICIAN ASSISTANT

## 2025-01-21 PROCEDURE — 87591 N.GONORRHOEAE DNA AMP PROB: CPT | Performed by: PHYSICIAN ASSISTANT

## 2025-01-21 PROCEDURE — 87389 HIV-1 AG W/HIV-1&-2 AB AG IA: CPT | Performed by: PHYSICIAN ASSISTANT

## 2025-01-21 PROCEDURE — 99214 OFFICE O/P EST MOD 30 MIN: CPT | Performed by: PHYSICIAN ASSISTANT

## 2025-01-21 PROCEDURE — 86803 HEPATITIS C AB TEST: CPT | Performed by: PHYSICIAN ASSISTANT

## 2025-01-21 PROCEDURE — 87210 SMEAR WET MOUNT SALINE/INK: CPT | Performed by: PHYSICIAN ASSISTANT

## 2025-01-21 RX ORDER — METRONIDAZOLE 7.5 MG/G
1 GEL VAGINAL DAILY
Qty: 25 G | Refills: 0 | Status: SHIPPED | OUTPATIENT
Start: 2025-01-21 | End: 2025-01-26

## 2025-01-22 LAB
C TRACH DNA SPEC QL PROBE+SIG AMP: NEGATIVE
HCV AB SERPL QL IA: NONREACTIVE
HIV 1+2 AB+HIV1 P24 AG SERPL QL IA: NONREACTIVE
N GONORRHOEA DNA SPEC QL NAA+PROBE: NEGATIVE
SPECIMEN TYPE: NORMAL
T PALLIDUM AB SER QL: NONREACTIVE

## 2025-01-22 NOTE — PATIENT INSTRUCTIONS
You currently have a vaginal infection called bacterial vaginosis, BV for short.   This is not a sexually transmitted infection and it cannot be transmitted to your partner.  BV typically occurs due to a change in pH balance of the vagina. The following things may cause BV to occur: douching, new soaps or lubricants, or oral sex. Sometimes we can't prevent BV because it can happen for no reason at all.   Sometimes BV is asymptomatic, but classically it causes thin or creamy odorous vaginal discharge. It can also cause some low abdominal discomfort.   Today we will treat this infection with a antibiotic called Metrogel. Apply vaginally before bed x 5 days. Wear a panty liner during the day.   Follow up if you continue to have symptoms after you have completed your treatment.

## 2025-01-22 NOTE — PROGRESS NOTES
Patient presents with:  STD: STD testing.          ICD-10-CM    1. BV (bacterial vaginosis)  N76.0 metroNIDAZOLE (METROGEL) 0.75 % vaginal gel    B96.89       2. Screen for STD (sexually transmitted disease)  Z11.3 Wet preparation     Treponema Abs w Reflex to RPR and Titer     Hepatitis C antibody     HIV Antigen Antibody Combo Cascade     Chlamydia trachomatis/Neisseria gonorrhoeae by PCR - Clinic Collect          Patient Instructions   You currently have a vaginal infection called bacterial vaginosis, BV for short.   This is not a sexually transmitted infection and it cannot be transmitted to your partner.  BV typically occurs due to a change in pH balance of the vagina. The following things may cause BV to occur: douching, new soaps or lubricants, or oral sex. Sometimes we can't prevent BV because it can happen for no reason at all.   Sometimes BV is asymptomatic, but classically it causes thin or creamy odorous vaginal discharge. It can also cause some low abdominal discomfort.   Today we will treat this infection with a antibiotic called Metrogel. Apply vaginally before bed x 5 days. Wear a panty liner during the day.   Follow up if you continue to have symptoms after you have completed your treatment.       HPI:  Rebecca Salas is a 29 year old female who presents today with interest in STD testing.  No known exposures to STDs, but she suspects that her partner has a new partner.  She denies any current symptoms other than mild intermittent pelvic cramping x 4-5 days. No abnormal vaginal discharge or painful urination.     History obtained from the patient.    Problem List:  2024-04: Class 2 severe obesity due to excess calories with serious   comorbidity in adult (H)  2023-10: Reactive airway disease in pediatric patient  2023-10: Tobacco use disorder  2023-06: Cigarette nicotine dependence without complication  2023-06: Gastroesophageal reflux disease with esophagitis without   hemorrhage  2022-10:  "Healthcare maintenance  2022-10: Dysmenorrhea  2022-10: Female infertility  2022-10: Essential hypertension  2021: Overweight (BMI 25.0-29.9)  2021: Dyslipidemia  2021: Snores  2018-10: Ovarian cyst  2016:  (normal spontaneous vaginal delivery)  2016: Supervision of high risk pregnancy in third trimester  2009: Depressive disorder  2008: Adjustment reaction with mixed disturbance of emotions and   conduct      Past Medical History:   Diagnosis Date     (normal spontaneous vaginal delivery) 2016    Supervision of high risk pregnancy in third trimester 2016    Overview:  [facility]  TESTING ONLY   NEXT VISIT ALERTS:  More testing?-order unclear    PRIMARY DIAGNOSIS: 20 y.o.        Estimated Date of Delivery: 16  Elevated BP Daily THC use-to control nausea, sleep and coping with anxiety Depression-on Zoloft H/O smoking  TESTING PLANS:   BPP/NST  LAST GROWTH:   PRIMARY PHYSICIAN/PHONE:  Dr White [id number]  PERTINENT LABS: B+ 11/2/15, 3/2/16, 16        Social History     Tobacco Use    Smoking status: Former     Current packs/day: 0.50     Types: Cigarettes     Passive exposure: Current    Smokeless tobacco: Never    Tobacco comments:     vapes   Substance Use Topics    Alcohol use: Not Currently     Comment: Alcoholic Drinks/day: \"not really\"         Review of Systems    Vitals:    25 1823   BP: (!) 142/88   Pulse: 74   Resp: 16   Temp: 98.1  F (36.7  C)   TempSrc: Oral   SpO2: 97%   Weight: 86.2 kg (190 lb)       Physical Exam  Vitals and nursing note reviewed.   Constitutional:       General: She is not in acute distress.     Appearance: She is not toxic-appearing or diaphoretic.      Comments: Cigarette smell present in room   HENT:      Head: Normocephalic and atraumatic.      Right Ear: External ear normal.      Left Ear: External ear normal.   Eyes:      Conjunctiva/sclera: Conjunctivae normal.   Pulmonary:      Effort: Pulmonary effort is normal. No " respiratory distress.   Neurological:      Mental Status: She is alert.   Psychiatric:         Mood and Affect: Mood normal.         Behavior: Behavior normal.         Thought Content: Thought content normal.         Judgment: Judgment normal.         Results:  Results for orders placed or performed in visit on 01/21/25   Wet preparation     Status: Abnormal    Specimen: Vagina; Swab   Result Value Ref Range    Trichomonas Absent Absent    Yeast Absent Absent    Clue Cells Present (A) Absent    WBCs/high power field 2+ (A) None         At the end of the encounter, I discussed results, diagnosis, medications. Discussed red flags for immediate return to clinic/ER, as well as indications for follow up if no improvement. Patient understood and agreed to plan. Patient was stable for discharge.

## 2025-05-12 ENCOUNTER — RESULTS FOLLOW-UP (OUTPATIENT)
Dept: FAMILY MEDICINE | Facility: CLINIC | Age: 30
End: 2025-05-12

## 2025-05-12 ENCOUNTER — OFFICE VISIT (OUTPATIENT)
Dept: FAMILY MEDICINE | Facility: CLINIC | Age: 30
End: 2025-05-12
Payer: COMMERCIAL

## 2025-05-12 VITALS
DIASTOLIC BLOOD PRESSURE: 93 MMHG | RESPIRATION RATE: 16 BRPM | OXYGEN SATURATION: 96 % | BODY MASS INDEX: 35.17 KG/M2 | HEART RATE: 76 BPM | TEMPERATURE: 97.9 F | HEIGHT: 64 IN | SYSTOLIC BLOOD PRESSURE: 137 MMHG | WEIGHT: 206 LBS

## 2025-05-12 DIAGNOSIS — J06.9 VIRAL URI WITH COUGH: ICD-10-CM

## 2025-05-12 DIAGNOSIS — J45.909 REACTIVE AIRWAY DISEASE IN PEDIATRIC PATIENT: ICD-10-CM

## 2025-05-12 DIAGNOSIS — R06.02 SOB (SHORTNESS OF BREATH): ICD-10-CM

## 2025-05-12 DIAGNOSIS — J30.2 SEASONAL ALLERGIC RHINITIS, UNSPECIFIED TRIGGER: ICD-10-CM

## 2025-05-12 DIAGNOSIS — I10 ESSENTIAL HYPERTENSION: ICD-10-CM

## 2025-05-12 DIAGNOSIS — H69.93 DYSFUNCTION OF BOTH EUSTACHIAN TUBES: Primary | ICD-10-CM

## 2025-05-12 LAB
DEPRECATED S PYO AG THROAT QL EIA: NEGATIVE
FLUAV AG SPEC QL IA: NEGATIVE
FLUBV AG SPEC QL IA: NEGATIVE
S PYO DNA THROAT QL NAA+PROBE: NOT DETECTED

## 2025-05-12 PROCEDURE — 87635 SARS-COV-2 COVID-19 AMP PRB: CPT | Performed by: NURSE PRACTITIONER

## 2025-05-12 PROCEDURE — 87651 STREP A DNA AMP PROBE: CPT | Performed by: NURSE PRACTITIONER

## 2025-05-12 PROCEDURE — 87804 INFLUENZA ASSAY W/OPTIC: CPT | Performed by: NURSE PRACTITIONER

## 2025-05-12 PROCEDURE — 1126F AMNT PAIN NOTED NONE PRSNT: CPT | Performed by: NURSE PRACTITIONER

## 2025-05-12 PROCEDURE — 3075F SYST BP GE 130 - 139MM HG: CPT | Performed by: NURSE PRACTITIONER

## 2025-05-12 PROCEDURE — 99214 OFFICE O/P EST MOD 30 MIN: CPT | Performed by: NURSE PRACTITIONER

## 2025-05-12 PROCEDURE — 3080F DIAST BP >= 90 MM HG: CPT | Performed by: NURSE PRACTITIONER

## 2025-05-12 RX ORDER — CETIRIZINE HYDROCHLORIDE 10 MG/1
10 TABLET ORAL DAILY
Qty: 30 TABLET | Refills: 2 | Status: SHIPPED | OUTPATIENT
Start: 2025-05-12

## 2025-05-12 RX ORDER — ALBUTEROL SULFATE 90 UG/1
1-2 INHALANT RESPIRATORY (INHALATION) EVERY 4 HOURS PRN
Qty: 18 G | Refills: 1 | Status: SHIPPED | OUTPATIENT
Start: 2025-05-12

## 2025-05-12 RX ORDER — FLUTICASONE PROPIONATE 50 MCG
1 SPRAY, SUSPENSION (ML) NASAL DAILY
Qty: 16 G | Refills: 1 | Status: SHIPPED | OUTPATIENT
Start: 2025-05-12

## 2025-05-12 ASSESSMENT — ASTHMA QUESTIONNAIRES
QUESTION_5 LAST FOUR WEEKS HOW WOULD YOU RATE YOUR ASTHMA CONTROL: SOMEWHAT CONTROLLED
QUESTION_1 LAST FOUR WEEKS HOW MUCH OF THE TIME DID YOUR ASTHMA KEEP YOU FROM GETTING AS MUCH DONE AT WORK, SCHOOL OR AT HOME: NONE OF THE TIME
QUESTION_4 LAST FOUR WEEKS HOW OFTEN HAVE YOU USED YOUR RESCUE INHALER OR NEBULIZER MEDICATION (SUCH AS ALBUTEROL): NOT AT ALL
QUESTION_3 LAST FOUR WEEKS HOW OFTEN DID YOUR ASTHMA SYMPTOMS (WHEEZING, COUGHING, SHORTNESS OF BREATH, CHEST TIGHTNESS OR PAIN) WAKE YOU UP AT NIGHT OR EARLIER THAN USUAL IN THE MORNING: ONCE OR TWICE
QUESTION_1 LAST FOUR WEEKS HOW MUCH OF THE TIME DID YOUR ASTHMA KEEP YOU FROM GETTING AS MUCH DONE AT WORK, SCHOOL OR AT HOME: NONE OF THE TIME
ACT_TOTALSCORE: 20
QUESTION_2 LAST FOUR WEEKS HOW OFTEN HAVE YOU HAD SHORTNESS OF BREATH: ONCE OR TWICE A WEEK
QUESTION_5 LAST FOUR WEEKS HOW WOULD YOU RATE YOUR ASTHMA CONTROL: SOMEWHAT CONTROLLED
QUESTION_3 LAST FOUR WEEKS HOW OFTEN DID YOUR ASTHMA SYMPTOMS (WHEEZING, COUGHING, SHORTNESS OF BREATH, CHEST TIGHTNESS OR PAIN) WAKE YOU UP AT NIGHT OR EARLIER THAN USUAL IN THE MORNING: ONCE A WEEK
QUESTION_4 LAST FOUR WEEKS HOW OFTEN HAVE YOU USED YOUR RESCUE INHALER OR NEBULIZER MEDICATION (SUCH AS ALBUTEROL): NOT AT ALL
QUESTION_2 LAST FOUR WEEKS HOW OFTEN HAVE YOU HAD SHORTNESS OF BREATH: ONCE OR TWICE A WEEK
ACT_TOTALSCORE: 21

## 2025-05-12 ASSESSMENT — ENCOUNTER SYMPTOMS
SORE THROAT: 1
WHEEZING: 1

## 2025-05-12 ASSESSMENT — PAIN SCALES - GENERAL: PAINLEVEL_OUTOF10: NO PAIN (0)

## 2025-05-12 NOTE — PATIENT INSTRUCTIONS
Your rapid strep test was negative. Your strep PCR/culture will be sent off and results available on [facility]. You likely have a viral infection which should resolve within the next week.  You may take Ibuprofen and/or Tylenol for pain/fevers.  Other supportive therapy to try: throat lozenges, salt water gargles, soft and cold foods.  Rest and stay hydrated.  Flonase and zyrtec for symptom relief.   If symptoms worsen or do not improve over the next week, follow-up with your PCP.

## 2025-05-12 NOTE — LETTER
2025    Rebecca Salas   1995        To Whom it May Concern;    Please excuse Rebecca Salas from work/school for a healthcare visit on May 12, 2025.    Sincerely,        Emily Lott DNP, NP-C  
You can access the [facility] Patient Portal, offered by [facility], by registering with the following website: [email or web address]

## 2025-05-12 NOTE — PROGRESS NOTES
"  Assessment & Plan     Dysfunction of both eustachian tubes  Likely viral in nature. Daughter is also sick. Flonase and zyrtec prescribed for symptom management. Follow-up as needed.     - fluticasone (FLONASE) 50 MCG/ACT nasal spray; Spray 1 spray into both nostrils daily.  - cetirizine (ZYRTEC) 10 MG tablet; Take 1 tablet (10 mg) by mouth daily.    Seasonal allergic rhinitis, unspecified trigger  She states she has seasonal allergies and would like to see allergy specialist. Referral placed.     - Adult Allergy/Asthma  Referral; Future    Viral URI with cough  Strep and flu were both negative.     - COVID-19 Virus (Coronavirus) by PCR Nose  - Streptococcus A Rapid Screen w/Reflex to PCR - Clinic Collect  - Influenza A & B Antigen - Clinic Collect  - albuterol (PROAIR HFA/PROVENTIL HFA/VENTOLIN HFA) 108 (90 Base) MCG/ACT inhaler; Inhale 1-2 puffs into the lungs every 4 hours as needed for shortness of breath, wheezing or cough.  - Group A Streptococcus PCR Throat Swab    Reactive airway disease in pediatric patient  Albuterol refilled.     - albuterol (PROAIR HFA/PROVENTIL HFA/VENTOLIN HFA) 108 (90 Base) MCG/ACT inhaler; Inhale 1-2 puffs into the lungs every 4 hours as needed for shortness of breath, wheezing or cough.    SOB (shortness of breath)  See above.     - albuterol (PROAIR HFA/PROVENTIL HFA/VENTOLIN HFA) 108 (90 Base) MCG/ACT inhaler; Inhale 1-2 puffs into the lungs every 4 hours as needed for shortness of breath, wheezing or cough.    Essential hypertension  Slightly elevated. Likely 2/2 to illness. Follow-up with PCP if no improvement after illness resolves.             BMI  Estimated body mass index is 35.36 kg/m  as calculated from the following:    Height as of this encounter: 1.626 m (5' 4\").    Weight as of this encounter: 93.4 kg (206 lb).         Follow-up   Return if symptoms worsen or fail to improve.        Harpreet Fernandez is a 29 year old, presenting for the following health " issues:  Pharyngitis, Asthma (Due for asthma action plan, refill albuterol for prn use ), Allergies (Wondering if she has allergies, would like a referral to allergist for testing ), and Letter for School/Work (That she was in today)        5/12/2025     9:55 AM   Additional Questions   Roomed by Jodi   Accompanied by DAughter         5/12/2025     9:55 AM   Patient Reported Additional Medications   Patient reports taking the following new medications Benadryl, saline nasal spray     Pharyngitis     Allergies  Associated symptoms include a sore throat.   History of Present Illness       Reason for visit:  Sore throat, wondering about allergies  Symptom onset:  3-7 days ago  Symptoms include:  Sore throat, sneezing, nose feels weird, nasal congestion, ears feel full  Symptom intensity:  Moderate  Symptom progression:  Improving  Had these symptoms before:  Yes  Has tried/received treatment for these symptoms:  Yes  Previous treatment was successful:  Yes  Prior treatment description:  Medication  What makes it worse:  Unsure, wondering if she has allergies  What makes it better:  Unsure   She is taking medications regularly.          Additional provider notes: Patient presents for the following:     Cold/sore throat/allergies: She has had a lot of sneezing and nasal congestion for a week. Feels symptoms worsened after work last week when she was outside with window open. Ears and throat are uncomfortable. Has been using saline nasal spray and benadryl for the past couple days.     Right breast concern: she noticed a lump/mass by provider a couple years ago. US was done and it was normal. Patient feels tenderness in the area off and on, but generally it is there most of the time.     Allergies   Allergen Reactions    Nickel Unknown and Hives     Other reaction(s): Hives       Current Outpatient Medications   Medication Sig Dispense Refill    albuterol (PROAIR HFA/PROVENTIL HFA/VENTOLIN HFA) 108 (90 Base) MCG/ACT  inhaler Inhale 1-2 puffs into the lungs every 4 hours as needed for shortness of breath, wheezing or cough. 18 g 1    cetirizine (ZYRTEC) 10 MG tablet Take 1 tablet (10 mg) by mouth daily. 30 tablet 2    fluticasone (FLONASE) 50 MCG/ACT nasal spray Spray 1 spray into both nostrils daily. 16 g 1    polyethylene glycol (MIRALAX) 17 GM/Dose powder Take 17 g (1 Capful) by mouth daily (Patient taking differently: Take 1 Capful by mouth daily. Prn) 850 g 0    buPROPion (WELLBUTRIN XL) 150 MG 24 hr tablet Take 1 tablet (150 mg) by mouth every morning (Patient not taking: Reported on 2025) 90 tablet 0    fluconazole (DIFLUCAN) 150 MG tablet Take 1 tablet by mouth for vaginal yeast infection. May take second tablet if not improving in 48 hours. Call [facility] if you are not improving in 2 days to let us know and we will switch medications to treat BV. (Patient not taking: Reported on 2025) 2 tablet 0    ketoconazole (NIZORAL) 2 % external shampoo Apply topically daily as needed for itching or irritation (Patient not taking: Reported on 2025) 120 mL 3    nicotine (NICODERM CQ) 21 MG/24HR 24 hr patch Place 1 patch onto the skin every 24 hours (Patient not taking: Reported on 2025) 28 patch 2     No current facility-administered medications for this visit.       Past Medical History:   Diagnosis Date     (normal spontaneous vaginal delivery) 2016    Supervision of high risk pregnancy in third trimester 2016    Overview:  [facility]  TESTING ONLY   NEXT VISIT ALERTS:  More testing?-order unclear    PRIMARY DIAGNOSIS: 20 y.o.        Estimated Date of Delivery: 16  Elevated BP Daily THC use-to control nausea, sleep and coping with anxiety Depression-on Zoloft H/O smoking  TESTING PLANS:   BPP/NST  LAST GROWTH:   PRIMARY PHYSICIAN/PHONE:  Dr White [id number]  PERTINENT LABS: B+ 11/2/15, 3/2/16, 16             Review of Systems  Constitutional, HEENT, cardiovascular, pulmonary, gi and  "gu systems are negative, except as otherwise noted.      Objective    BP (!) 137/93 (BP Location: Right arm, Patient Position: Sitting, Cuff Size: Adult Large)   Pulse 76   Temp 97.9  F (36.6  C) (Oral)   Resp 16   Ht 1.626 m (5' 4\")   Wt 93.4 kg (206 lb)   LMP 04/03/2025 (Approximate)   SpO2 96%   Breastfeeding No   BMI 35.36 kg/m    Body mass index is 35.36 kg/m .  Physical Exam  Vitals reviewed.   Constitutional:       General: She is not in acute distress.     Appearance: Normal appearance. She is not ill-appearing or toxic-appearing.   HENT:      Right Ear: Ear canal and external ear normal. There is no impacted cerumen. Tympanic membrane is bulging.      Left Ear: Ear canal and external ear normal. There is no impacted cerumen. Tympanic membrane is bulging.      Mouth/Throat:      Mouth: Mucous membranes are moist.   Cardiovascular:      Rate and Rhythm: Normal rate and regular rhythm.      Pulses: Normal pulses.      Heart sounds: Normal heart sounds.   Pulmonary:      Effort: Pulmonary effort is normal.      Breath sounds: Normal breath sounds.   Musculoskeletal:         General: Normal range of motion.      Cervical back: Normal range of motion and neck supple. No tenderness.   Lymphadenopathy:      Cervical: No cervical adenopathy.   Skin:     General: Skin is warm and dry.   Neurological:      Mental Status: She is alert and oriented to person, place, and time.   Psychiatric:         Behavior: Behavior normal.            Results for orders placed or performed in visit on 05/12/25 (from the past 24 hours)   Streptococcus A Rapid Screen w/Reflex to PCR - Clinic Collect    Specimen: Throat; Swab   Result Value Ref Range    Group A Strep antigen Negative Negative   Influenza A & B Antigen - Clinic Collect    Specimen: Nose; Swab   Result Value Ref Range    Influenza A antigen Negative Negative    Influenza B antigen Negative Negative    Narrative    Test results must be correlated with clinical data. " If necessary, results should be confirmed by a molecular assay or viral culture.           Signed Electronically by: Emily Lott DNP

## 2025-05-13 ENCOUNTER — PATIENT OUTREACH (OUTPATIENT)
Dept: CARE COORDINATION | Facility: CLINIC | Age: 30
End: 2025-05-13
Payer: COMMERCIAL

## 2025-05-13 LAB — SARS-COV-2 RNA RESP QL NAA+PROBE: NEGATIVE

## 2025-05-15 ENCOUNTER — PATIENT OUTREACH (OUTPATIENT)
Dept: CARE COORDINATION | Facility: CLINIC | Age: 30
End: 2025-05-15
Payer: COMMERCIAL

## 2025-06-01 ENCOUNTER — HEALTH MAINTENANCE LETTER (OUTPATIENT)
Age: 30
End: 2025-06-01